# Patient Record
Sex: FEMALE | Race: WHITE | NOT HISPANIC OR LATINO | Employment: OTHER | URBAN - NONMETROPOLITAN AREA
[De-identification: names, ages, dates, MRNs, and addresses within clinical notes are randomized per-mention and may not be internally consistent; named-entity substitution may affect disease eponyms.]

---

## 2019-02-01 ENCOUNTER — OFFICE VISIT (OUTPATIENT)
Dept: FAMILY MEDICINE CLINIC | Facility: CLINIC | Age: 69
End: 2019-02-01
Payer: MEDICARE

## 2019-02-01 VITALS
SYSTOLIC BLOOD PRESSURE: 140 MMHG | HEIGHT: 67 IN | RESPIRATION RATE: 20 BRPM | HEART RATE: 81 BPM | WEIGHT: 282 LBS | OXYGEN SATURATION: 96 % | TEMPERATURE: 96.6 F | BODY MASS INDEX: 44.26 KG/M2 | DIASTOLIC BLOOD PRESSURE: 94 MMHG

## 2019-02-01 DIAGNOSIS — Z11.59 NEED FOR HEPATITIS C SCREENING TEST: ICD-10-CM

## 2019-02-01 DIAGNOSIS — Z76.89 ESTABLISHING CARE WITH NEW DOCTOR, ENCOUNTER FOR: Primary | ICD-10-CM

## 2019-02-01 DIAGNOSIS — E66.01 MORBID OBESITY (HCC): ICD-10-CM

## 2019-02-01 DIAGNOSIS — Z12.31 SCREENING MAMMOGRAM, ENCOUNTER FOR: ICD-10-CM

## 2019-02-01 DIAGNOSIS — Z12.11 ENCOUNTER FOR SCREENING COLONOSCOPY FOR NON-HIGH-RISK PATIENT: ICD-10-CM

## 2019-02-01 DIAGNOSIS — R53.83 FATIGUE, UNSPECIFIED TYPE: ICD-10-CM

## 2019-02-01 PROCEDURE — 99213 OFFICE O/P EST LOW 20 MIN: CPT | Performed by: FAMILY MEDICINE

## 2019-02-01 NOTE — PROGRESS NOTES
OFFICE VISIT  Cassandra Hickey 80 y o  female MRN: [id number]      Assessment / Plan:  Diagnoses and all orders for this visit:    Establishing care with new doctor, encounter for    Screening mammogram, encounter for  -     Mammo screening bilateral w cad; Future    Morbid obesity (HCC)  -     CBC and differential; Future  -     Comprehensive metabolic panel; Future  -     Lipid Panel with Direct LDL reflex; Future  -     TSH, 3rd generation with Free T4 reflex; Future  -     HEMOGLOBIN A1C W/ EAG ESTIMATION; Future  -     UA (URINE) with reflex to Microscopic    Need for hepatitis C screening test  -     Hepatitis C antibody; Future    Fatigue, unspecified type  -     ECG 12 lead; Future  -     XR chest pa & lateral; Future    Encounter for screening colonoscopy for non-high-risk patient  -     Occult Blood, Fecal Immunochemical; Future          Reason For Visit / Chief Complaint  Chief Complaint   Patient presents with    [facility]    Headache    Mass        HPI:  Catrachito Arroyo is a 80 y o  female to est care  She has not been by a PCP for years  She denies any medical problmes, on no medication  She report a mammogram and pap test two years ago  She has never had a colonoscopy  She reports a lump behind her head, she had noticed this lump two months ago  She has prior hx of headaches, which has subsided  She is a smoker, 1 5 packs daily for "years"  No intentions on quitting  Historical Information   History reviewed  No pertinent past medical history    Past Surgical History:   Procedure Laterality Date    GALLBLADDER SURGERY  2008     Social History   History   Alcohol Use    Yes     Comment: socially     History   Drug Use No     History   Smoking Status    Current Every Day Smoker   Smokeless Tobacco    Never Used     Family History   Problem Relation Age of Onset    Cervical cancer Mother     Heart disease Father        Meds/Allergies   No Known Allergies    Meds:  No current outpatient prescriptions on file  REVIEW OF SYSTEMS  Review of Systems   Constitutional: Positive for fatigue  Negative for activity change, chills and fever  HENT: Negative for congestion, ear discharge, ear pain, sinus pain, sinus pressure, sore throat, tinnitus and trouble swallowing  Eyes: Negative for photophobia, pain, discharge, itching and visual disturbance  Respiratory: Negative for cough, chest tightness, shortness of breath and wheezing  Cardiovascular: Negative for chest pain and leg swelling  Gastrointestinal: Negative for abdominal distention, abdominal pain, constipation, diarrhea, nausea and vomiting  Endocrine: Negative for polydipsia, polyphagia and polyuria  Genitourinary: Negative for dysuria and frequency  Musculoskeletal: Negative for arthralgias, myalgias, neck pain and neck stiffness  Skin: Negative for color change  Neurological: Negative for dizziness, syncope, weakness, numbness and headaches  Hematological: Does not bruise/bleed easily  Psychiatric/Behavioral: Negative for behavioral problems, confusion, self-injury, sleep disturbance and suicidal ideas  The patient is not nervous/anxious  Current Vitals:   Blood Pressure: 140/94 (02/01/19 1141)  Pulse: 81 (02/01/19 1141)  Temperature: (!) 96 6 °F (35 9 °C) (02/01/19 1141)  Temp Source: Tympanic (02/01/19 1141)  Respirations: 20 (02/01/19 1141)  Height: 5' 7" (170 2 cm) (02/01/19 1141)  Weight - Scale: 128 kg (282 lb) (02/01/19 1141)  SpO2: 96 % (02/01/19 1141)  [unfilled]    PHYSICAL EXAMS:  Physical Exam   Constitutional: She is oriented to person, place, and time  She appears well-developed and well-nourished  HENT:   Head: Normocephalic  Right Ear: External ear normal    Left Ear: External ear normal    Mouth/Throat: Oropharynx is clear and moist    Eyes: Pupils are equal, round, and reactive to light  Conjunctivae are normal    Neck: Neck supple  Cardiovascular: Normal rate and regular rhythm  Pulmonary/Chest: Effort normal and breath sounds normal    Abdominal: Soft  Bowel sounds are normal  She exhibits no distension  There is no tenderness  Musculoskeletal: Normal range of motion  Neurological: She is alert and oriented to person, place, and time  Skin: Skin is warm and dry  Psychiatric: She has a normal mood and affect  Follow up at this office in one month    Counseling / Coordination of Care  Total floor / unit time spent today 20 minutes  Greater than 50% of total time was spent with the patient and / or family counseling and / or coordination of care

## 2019-03-01 ENCOUNTER — TELEPHONE (OUTPATIENT)
Dept: FAMILY MEDICINE CLINIC | Facility: CLINIC | Age: 69
End: 2019-03-01

## 2022-03-29 ENCOUNTER — APPOINTMENT (EMERGENCY)
Dept: CT IMAGING | Facility: HOSPITAL | Age: 72
DRG: 074 | End: 2022-03-29
Payer: COMMERCIAL

## 2022-03-29 ENCOUNTER — HOSPITAL ENCOUNTER (INPATIENT)
Facility: HOSPITAL | Age: 72
LOS: 2 days | Discharge: HOME | DRG: 074 | End: 2022-03-31
Attending: EMERGENCY MEDICINE | Admitting: FAMILY MEDICINE
Payer: COMMERCIAL

## 2022-03-29 ENCOUNTER — NURSE TRIAGE (OUTPATIENT)
Dept: OTHER | Facility: OTHER | Age: 72
End: 2022-03-29

## 2022-03-29 DIAGNOSIS — E04.1 THYROID NODULE INCIDENTALLY NOTED ON IMAGING STUDY: ICD-10-CM

## 2022-03-29 DIAGNOSIS — I10 PRIMARY HYPERTENSION: ICD-10-CM

## 2022-03-29 DIAGNOSIS — R29.90 STROKE-LIKE SYMPTOMS: Primary | ICD-10-CM

## 2022-03-29 DIAGNOSIS — G51.0 BELL'S PALSY: ICD-10-CM

## 2022-03-29 DIAGNOSIS — E78.5 DYSLIPIDEMIA: ICD-10-CM

## 2022-03-29 DIAGNOSIS — E04.1 THYROID NODULE: ICD-10-CM

## 2022-03-29 LAB
ANION GAP SERPL CALCULATED.3IONS-SCNC: 3 MMOL/L (ref 4–13)
APTT PPP: 46 SECONDS (ref 23–37)
BUN SERPL-MCNC: 13 MG/DL (ref 5–25)
CALCIUM SERPL-MCNC: 9 MG/DL (ref 8.3–10.1)
CARDIAC TROPONIN I PNL SERPL HS: 3 NG/L
CHLORIDE SERPL-SCNC: 107 MMOL/L (ref 100–108)
CO2 SERPL-SCNC: 24 MMOL/L (ref 21–32)
CREAT SERPL-MCNC: 0.96 MG/DL (ref 0.6–1.3)
ERYTHROCYTE [DISTWIDTH] IN BLOOD BY AUTOMATED COUNT: 13.2 % (ref 11.6–15.1)
GFR SERPL CREATININE-BSD FRML MDRD: 60 ML/MIN/1.73SQ M
GLUCOSE SERPL-MCNC: 107 MG/DL (ref 65–140)
GLUCOSE SERPL-MCNC: 115 MG/DL (ref 65–140)
HCT VFR BLD AUTO: 49.9 % (ref 34.8–46.1)
HGB BLD-MCNC: 16.2 G/DL (ref 11.5–15.4)
INR PPP: 0.96 (ref 0.84–1.19)
MCH RBC QN AUTO: 31.9 PG (ref 26.8–34.3)
MCHC RBC AUTO-ENTMCNC: 32.5 G/DL (ref 31.4–37.4)
MCV RBC AUTO: 98 FL (ref 82–98)
NT-PROBNP SERPL-MCNC: 98 PG/ML
PLATELET # BLD AUTO: 262 THOUSANDS/UL (ref 149–390)
PMV BLD AUTO: 10.2 FL (ref 8.9–12.7)
POTASSIUM SERPL-SCNC: 3.8 MMOL/L (ref 3.5–5.3)
PROTHROMBIN TIME: 12.4 SECONDS (ref 11.6–14.5)
RBC # BLD AUTO: 5.08 MILLION/UL (ref 3.81–5.12)
SODIUM SERPL-SCNC: 134 MMOL/L (ref 136–145)
WBC # BLD AUTO: 10.84 THOUSAND/UL (ref 4.31–10.16)

## 2022-03-29 PROCEDURE — 82948 REAGENT STRIP/BLOOD GLUCOSE: CPT

## 2022-03-29 PROCEDURE — 99291 CRITICAL CARE FIRST HOUR: CPT | Performed by: EMERGENCY MEDICINE

## 2022-03-29 PROCEDURE — 85027 COMPLETE CBC AUTOMATED: CPT | Performed by: EMERGENCY MEDICINE

## 2022-03-29 PROCEDURE — 99222 1ST HOSP IP/OBS MODERATE 55: CPT

## 2022-03-29 PROCEDURE — 85610 PROTHROMBIN TIME: CPT | Performed by: EMERGENCY MEDICINE

## 2022-03-29 PROCEDURE — 80048 BASIC METABOLIC PNL TOTAL CA: CPT | Performed by: EMERGENCY MEDICINE

## 2022-03-29 PROCEDURE — 93005 ELECTROCARDIOGRAM TRACING: CPT

## 2022-03-29 PROCEDURE — 85730 THROMBOPLASTIN TIME PARTIAL: CPT | Performed by: EMERGENCY MEDICINE

## 2022-03-29 PROCEDURE — 70498 CT ANGIOGRAPHY NECK: CPT

## 2022-03-29 PROCEDURE — 84484 ASSAY OF TROPONIN QUANT: CPT | Performed by: EMERGENCY MEDICINE

## 2022-03-29 PROCEDURE — 70496 CT ANGIOGRAPHY HEAD: CPT

## 2022-03-29 PROCEDURE — 99285 EMERGENCY DEPT VISIT HI MDM: CPT

## 2022-03-29 PROCEDURE — 83880 ASSAY OF NATRIURETIC PEPTIDE: CPT | Performed by: EMERGENCY MEDICINE

## 2022-03-29 PROCEDURE — 36415 COLL VENOUS BLD VENIPUNCTURE: CPT | Performed by: EMERGENCY MEDICINE

## 2022-03-29 RX ORDER — ASPIRIN 325 MG
325 TABLET ORAL ONCE
Status: COMPLETED | OUTPATIENT
Start: 2022-03-29 | End: 2022-03-29

## 2022-03-29 RX ADMIN — ASPIRIN 325 MG: 325 TABLET ORAL at 21:53

## 2022-03-29 RX ADMIN — IOHEXOL 85 ML: 350 INJECTION, SOLUTION INTRAVENOUS at 21:29

## 2022-03-29 NOTE — TELEPHONE ENCOUNTER
Regarding: facial parts drooping   ----- Message from Gal Archibald sent at 3/29/2022  7:33 PM EDT -----  " My wife said her lip hurt and I looked over her lip is hanging to the right and her eye is also hanging to the right "

## 2022-03-29 NOTE — TELEPHONE ENCOUNTER
Spoke with patient's  and he reports she has facial drooping of left lip and eye  Denies other symptoms but difficult to get assessment  Patient does not have PCP  Advised he call 911 for her immediately  Patient stating she does not want to go to the hospital because she is tired  I encouraged patient and  for them to call 911 because of this serious emergent concern  I am not sure if they will follow recommendations

## 2022-03-29 NOTE — TELEPHONE ENCOUNTER
Reason for Disposition   [1] Weakness (i e , paralysis, loss of muscle strength) of the face, arm / hand, or leg / foot on one side of the body AND [2] sudden onset AND [3] present now (Exception: Bell's palsy suspected [i e , weakness only one side of the face, developing over hours to days, no other symptoms])    Answer Assessment - Initial Assessment Questions  1  SYMPTOM: "What is the main symptom you are concerned about?" (e g , weakness, numbness)      She is having difficulty chewing on the right side and drooling so when looking at her her left side of lip and eye is hanging     2  ONSET: "When did this start?" (minutes, hours, days; while sleeping)      Noticed it an hour ago     3  LAST NORMAL: "When was the last time you were normal (no symptoms)?"      around her     4  PATTERN "Does this come and go, or has it been constant since it started?"  "Is it present now?"      Constant     5  CARDIAC SYMPTOMS: "Have you had any of the following symptoms: chest pain, difficulty breathing, palpitations?"      Denies     6  NEUROLOGIC SYMPTOMS: "Have you had any of the following symptoms: headache, dizziness, vision loss, double vision, changes in speech, unsteady on your feet?"       Drooping face     7   OTHER SYMPTOMS: "Do you have any other symptoms?"  Denies    Protocols used: NEUROLOGIC DEFICIT-ADULT-

## 2022-03-30 ENCOUNTER — APPOINTMENT (INPATIENT)
Dept: MRI IMAGING | Facility: HOSPITAL | Age: 72
DRG: 074 | End: 2022-03-30
Payer: COMMERCIAL

## 2022-03-30 ENCOUNTER — APPOINTMENT (INPATIENT)
Dept: NON INVASIVE DIAGNOSTICS | Facility: HOSPITAL | Age: 72
DRG: 074 | End: 2022-03-30
Payer: COMMERCIAL

## 2022-03-30 PROBLEM — E66.01 CLASS 3 SEVERE OBESITY DUE TO EXCESS CALORIES WITH SERIOUS COMORBIDITY AND BODY MASS INDEX (BMI) OF 45.0 TO 49.9 IN ADULT (HCC): Status: ACTIVE | Noted: 2022-03-30

## 2022-03-30 PROBLEM — E66.813 CLASS 3 SEVERE OBESITY DUE TO EXCESS CALORIES WITH SERIOUS COMORBIDITY AND BODY MASS INDEX (BMI) OF 45.0 TO 49.9 IN ADULT (HCC): Status: ACTIVE | Noted: 2022-03-30

## 2022-03-30 PROBLEM — I10 PRIMARY HYPERTENSION: Status: ACTIVE | Noted: 2022-03-30

## 2022-03-30 PROBLEM — R29.90 STROKE-LIKE SYMPTOMS: Status: ACTIVE | Noted: 2022-03-30

## 2022-03-30 LAB
ALBUMIN SERPL BCP-MCNC: 3.2 G/DL (ref 3.5–5)
ALP SERPL-CCNC: 123 U/L (ref 46–116)
ALT SERPL W P-5'-P-CCNC: 33 U/L (ref 12–78)
ANION GAP SERPL CALCULATED.3IONS-SCNC: 9 MMOL/L (ref 4–13)
AORTIC ROOT: 3.1 CM
APICAL FOUR CHAMBER EJECTION FRACTION: 56 %
AST SERPL W P-5'-P-CCNC: 21 U/L (ref 5–45)
BASOPHILS # BLD AUTO: 0.05 THOUSANDS/ΜL (ref 0–0.1)
BASOPHILS NFR BLD AUTO: 1 % (ref 0–1)
BILIRUB SERPL-MCNC: 0.38 MG/DL (ref 0.2–1)
BUN SERPL-MCNC: 11 MG/DL (ref 5–25)
CALCIUM ALBUM COR SERPL-MCNC: 9.6 MG/DL (ref 8.3–10.1)
CALCIUM SERPL-MCNC: 9 MG/DL (ref 8.3–10.1)
CHLORIDE SERPL-SCNC: 107 MMOL/L (ref 100–108)
CHOLEST SERPL-MCNC: 244 MG/DL
CO2 SERPL-SCNC: 24 MMOL/L (ref 21–32)
CREAT SERPL-MCNC: 0.91 MG/DL (ref 0.6–1.3)
E WAVE DECELERATION TIME: 208 MS
EOSINOPHIL # BLD AUTO: 0.18 THOUSAND/ΜL (ref 0–0.61)
EOSINOPHIL NFR BLD AUTO: 2 % (ref 0–6)
ERYTHROCYTE [DISTWIDTH] IN BLOOD BY AUTOMATED COUNT: 13.2 % (ref 11.6–15.1)
EST. AVERAGE GLUCOSE BLD GHB EST-MCNC: 120 MG/DL
FRACTIONAL SHORTENING: 28 % (ref 28–44)
GFR SERPL CREATININE-BSD FRML MDRD: 64 ML/MIN/1.73SQ M
GLUCOSE SERPL-MCNC: 130 MG/DL (ref 65–140)
HBA1C MFR BLD: 5.8 %
HCT VFR BLD AUTO: 47 % (ref 34.8–46.1)
HDLC SERPL-MCNC: 42 MG/DL
HGB BLD-MCNC: 15.9 G/DL (ref 11.5–15.4)
IMM GRANULOCYTES # BLD AUTO: 0.04 THOUSAND/UL (ref 0–0.2)
IMM GRANULOCYTES NFR BLD AUTO: 0 % (ref 0–2)
INTERVENTRICULAR SEPTUM IN DIASTOLE (PARASTERNAL SHORT AXIS VIEW): 1.4 CM
INTERVENTRICULAR SEPTUM: 1.4 CM (ref 0.59–1.1)
LDLC SERPL CALC-MCNC: 181 MG/DL (ref 0–100)
LEFT ATRIUM AREA SYSTOLE SINGLE PLANE A4C: 14.2 CM2
LEFT ATRIUM SIZE: 3.6 CM
LEFT INTERNAL DIMENSION IN SYSTOLE: 3.3 CM (ref 7.86–11.92)
LEFT VENTRICULAR INTERNAL DIMENSION IN DIASTOLE: 4.6 CM (ref 13.44–20.04)
LEFT VENTRICULAR POSTERIOR WALL IN END DIASTOLE: 1.3 CM (ref 0.57–1.09)
LEFT VENTRICULAR STROKE VOLUME: 54 ML
LVSV (TEICH): 54 ML
LYMPHOCYTES # BLD AUTO: 2.87 THOUSANDS/ΜL (ref 0.6–4.47)
LYMPHOCYTES NFR BLD AUTO: 27 % (ref 14–44)
MCH RBC QN AUTO: 31.9 PG (ref 26.8–34.3)
MCHC RBC AUTO-ENTMCNC: 33.8 G/DL (ref 31.4–37.4)
MCV RBC AUTO: 94 FL (ref 82–98)
MONOCYTES # BLD AUTO: 1.03 THOUSAND/ΜL (ref 0.17–1.22)
MONOCYTES NFR BLD AUTO: 10 % (ref 4–12)
MV E'TISSUE VEL-SEP: 7 CM/S
MV PEAK A VEL: 1.01 M/S
MV PEAK E VEL: 49 CM/S
MV STENOSIS PRESSURE HALF TIME: 60 MS
MV VALVE AREA P 1/2 METHOD: 3.67 CM2
NEUTROPHILS # BLD AUTO: 6.36 THOUSANDS/ΜL (ref 1.85–7.62)
NEUTS SEG NFR BLD AUTO: 60 % (ref 43–75)
NRBC BLD AUTO-RTO: 0 /100 WBCS
PLATELET # BLD AUTO: 241 THOUSANDS/UL (ref 149–390)
PLATELET # BLD AUTO: 246 THOUSANDS/UL (ref 149–390)
PMV BLD AUTO: 10.5 FL (ref 8.9–12.7)
PMV BLD AUTO: 9.9 FL (ref 8.9–12.7)
POTASSIUM SERPL-SCNC: 3.8 MMOL/L (ref 3.5–5.3)
PROT SERPL-MCNC: 7.4 G/DL (ref 6.4–8.2)
RBC # BLD AUTO: 4.99 MILLION/UL (ref 3.81–5.12)
RIGHT ATRIUM AREA SYSTOLE A4C: 12.7 CM2
RIGHT VENTRICLE ID DIMENSION: 1.9 CM
SL CV LV EF: 65
SL CV PED ECHO LEFT VENTRICLE DIASTOLIC VOLUME (MOD BIPLANE) 2D: 99 ML
SL CV PED ECHO LEFT VENTRICLE SYSTOLIC VOLUME (MOD BIPLANE) 2D: 45 ML
SODIUM SERPL-SCNC: 140 MMOL/L (ref 136–145)
TRICUSPID ANNULAR PLANE SYSTOLIC EXCURSION: 2.2 CM
TRIGL SERPL-MCNC: 106 MG/DL
WBC # BLD AUTO: 10.53 THOUSAND/UL (ref 4.31–10.16)
Z-SCORE OF INTERVENTRICULAR SEPTUM IN END DIASTOLE: 4.24
Z-SCORE OF LEFT VENTRICULAR DIMENSION IN END DIASTOLE: -12.72
Z-SCORE OF LEFT VENTRICULAR DIMENSION IN END SYSTOLE: -8.5
Z-SCORE OF LEFT VENTRICULAR POSTERIOR WALL IN END DIASTOLE: 3.58

## 2022-03-30 PROCEDURE — 83036 HEMOGLOBIN GLYCOSYLATED A1C: CPT

## 2022-03-30 PROCEDURE — 85025 COMPLETE CBC W/AUTO DIFF WBC: CPT

## 2022-03-30 PROCEDURE — G1004 CDSM NDSC: HCPCS

## 2022-03-30 PROCEDURE — 99448 NTRPROF PH1/NTRNET/EHR 21-30: CPT | Performed by: PSYCHIATRY & NEUROLOGY

## 2022-03-30 PROCEDURE — 70551 MRI BRAIN STEM W/O DYE: CPT

## 2022-03-30 PROCEDURE — 93306 TTE W/DOPPLER COMPLETE: CPT

## 2022-03-30 PROCEDURE — 84443 ASSAY THYROID STIM HORMONE: CPT | Performed by: INTERNAL MEDICINE

## 2022-03-30 PROCEDURE — 80053 COMPREHEN METABOLIC PANEL: CPT

## 2022-03-30 PROCEDURE — 36415 COLL VENOUS BLD VENIPUNCTURE: CPT

## 2022-03-30 PROCEDURE — 93306 TTE W/DOPPLER COMPLETE: CPT | Performed by: INTERNAL MEDICINE

## 2022-03-30 PROCEDURE — 85049 AUTOMATED PLATELET COUNT: CPT

## 2022-03-30 PROCEDURE — 80061 LIPID PANEL: CPT

## 2022-03-30 PROCEDURE — 99233 SBSQ HOSP IP/OBS HIGH 50: CPT | Performed by: INTERNAL MEDICINE

## 2022-03-30 PROCEDURE — 97162 PT EVAL MOD COMPLEX 30 MIN: CPT

## 2022-03-30 RX ORDER — CALCIUM CARBONATE 200(500)MG
500 TABLET,CHEWABLE ORAL DAILY PRN
Status: DISCONTINUED | OUTPATIENT
Start: 2022-03-30 | End: 2022-03-31 | Stop reason: HOSPADM

## 2022-03-30 RX ORDER — CLOPIDOGREL BISULFATE 75 MG/1
75 TABLET ORAL DAILY
Status: DISCONTINUED | OUTPATIENT
Start: 2022-03-30 | End: 2022-03-30

## 2022-03-30 RX ORDER — AMLODIPINE BESYLATE 5 MG/1
5 TABLET ORAL DAILY
Status: DISCONTINUED | OUTPATIENT
Start: 2022-03-30 | End: 2022-03-31 | Stop reason: HOSPADM

## 2022-03-30 RX ORDER — ACETAMINOPHEN 325 MG/1
650 TABLET ORAL EVERY 4 HOURS PRN
Status: DISCONTINUED | OUTPATIENT
Start: 2022-03-30 | End: 2022-03-31 | Stop reason: HOSPADM

## 2022-03-30 RX ORDER — FAMOTIDINE 20 MG/1
20 TABLET, FILM COATED ORAL ONCE
Status: COMPLETED | OUTPATIENT
Start: 2022-03-30 | End: 2022-03-30

## 2022-03-30 RX ORDER — PANTOPRAZOLE SODIUM 40 MG/1
40 TABLET, DELAYED RELEASE ORAL
Status: DISCONTINUED | OUTPATIENT
Start: 2022-03-31 | End: 2022-03-31 | Stop reason: HOSPADM

## 2022-03-30 RX ORDER — SODIUM CHLORIDE, SODIUM LACTATE, POTASSIUM CHLORIDE, CALCIUM CHLORIDE 600; 310; 30; 20 MG/100ML; MG/100ML; MG/100ML; MG/100ML
75 INJECTION, SOLUTION INTRAVENOUS CONTINUOUS
Status: DISCONTINUED | OUTPATIENT
Start: 2022-03-30 | End: 2022-03-30

## 2022-03-30 RX ORDER — HEPARIN SODIUM 5000 [USP'U]/ML
5000 INJECTION, SOLUTION INTRAVENOUS; SUBCUTANEOUS EVERY 8 HOURS SCHEDULED
Status: DISCONTINUED | OUTPATIENT
Start: 2022-03-30 | End: 2022-03-30

## 2022-03-30 RX ORDER — ATORVASTATIN CALCIUM 40 MG/1
40 TABLET, FILM COATED ORAL EVERY EVENING
Status: DISCONTINUED | OUTPATIENT
Start: 2022-03-30 | End: 2022-03-31 | Stop reason: HOSPADM

## 2022-03-30 RX ORDER — LORAZEPAM 2 MG/ML
0.5 INJECTION INTRAMUSCULAR ONCE
Status: COMPLETED | OUTPATIENT
Start: 2022-03-30 | End: 2022-03-30

## 2022-03-30 RX ORDER — NICOTINE 21 MG/24HR
1 PATCH, TRANSDERMAL 24 HOURS TRANSDERMAL DAILY
Status: DISCONTINUED | OUTPATIENT
Start: 2022-03-30 | End: 2022-03-31 | Stop reason: HOSPADM

## 2022-03-30 RX ORDER — ONDANSETRON 2 MG/ML
4 INJECTION INTRAMUSCULAR; INTRAVENOUS EVERY 6 HOURS PRN
Status: DISCONTINUED | OUTPATIENT
Start: 2022-03-30 | End: 2022-03-31 | Stop reason: HOSPADM

## 2022-03-30 RX ORDER — MINERAL OIL AND PETROLATUM 150; 830 MG/G; MG/G
OINTMENT OPHTHALMIC
Status: DISCONTINUED | OUTPATIENT
Start: 2022-03-30 | End: 2022-03-31 | Stop reason: HOSPADM

## 2022-03-30 RX ORDER — VALACYCLOVIR HYDROCHLORIDE 500 MG/1
1000 TABLET, FILM COATED ORAL EVERY 8 HOURS SCHEDULED
Status: DISCONTINUED | OUTPATIENT
Start: 2022-03-30 | End: 2022-03-31 | Stop reason: HOSPADM

## 2022-03-30 RX ORDER — HEPARIN SODIUM 5000 [USP'U]/ML
5000 INJECTION, SOLUTION INTRAVENOUS; SUBCUTANEOUS EVERY 8 HOURS SCHEDULED
Status: DISCONTINUED | OUTPATIENT
Start: 2022-03-30 | End: 2022-03-31 | Stop reason: HOSPADM

## 2022-03-30 RX ORDER — PREDNISONE 20 MG/1
80 TABLET ORAL DAILY
Status: DISCONTINUED | OUTPATIENT
Start: 2022-03-30 | End: 2022-03-31 | Stop reason: HOSPADM

## 2022-03-30 RX ADMIN — AMLODIPINE BESYLATE 5 MG: 5 TABLET ORAL at 16:23

## 2022-03-30 RX ADMIN — HEPARIN SODIUM 5000 UNITS: 5000 INJECTION INTRAVENOUS; SUBCUTANEOUS at 00:53

## 2022-03-30 RX ADMIN — HEPARIN SODIUM 5000 UNITS: 5000 INJECTION INTRAVENOUS; SUBCUTANEOUS at 14:22

## 2022-03-30 RX ADMIN — ATORVASTATIN CALCIUM 40 MG: 40 TABLET, FILM COATED ORAL at 17:18

## 2022-03-30 RX ADMIN — PREDNISONE 80 MG: 20 TABLET ORAL at 16:23

## 2022-03-30 RX ADMIN — CALCIUM CARBONATE 500 MG: 500 TABLET, CHEWABLE ORAL at 23:34

## 2022-03-30 RX ADMIN — HEPARIN SODIUM 5000 UNITS: 5000 INJECTION INTRAVENOUS; SUBCUTANEOUS at 21:51

## 2022-03-30 RX ADMIN — FAMOTIDINE 20 MG: 20 TABLET ORAL at 23:34

## 2022-03-30 RX ADMIN — HEPARIN SODIUM 5000 UNITS: 5000 INJECTION INTRAVENOUS; SUBCUTANEOUS at 06:20

## 2022-03-30 RX ADMIN — ATORVASTATIN CALCIUM 40 MG: 40 TABLET, FILM COATED ORAL at 00:53

## 2022-03-30 RX ADMIN — VALACYCLOVIR HYDROCHLORIDE 1000 MG: 500 TABLET, FILM COATED ORAL at 17:18

## 2022-03-30 RX ADMIN — LORAZEPAM 0.5 MG: 2 INJECTION INTRAMUSCULAR; INTRAVENOUS at 12:04

## 2022-03-30 RX ADMIN — SODIUM CHLORIDE, SODIUM LACTATE, POTASSIUM CHLORIDE, AND CALCIUM CHLORIDE 75 ML/HR: .6; .31; .03; .02 INJECTION, SOLUTION INTRAVENOUS at 00:53

## 2022-03-30 RX ADMIN — MINERAL OIL AND PETROLATUM: 150; 830 OINTMENT OPHTHALMIC at 21:51

## 2022-03-30 NOTE — QUICK NOTE
Stroke alert called: 2043  Neurology response immediate  LKW: last night  NIHSS 1  for R facial droop per ED exam       CTH: No acute intracranial abnormality  Microangiopathic changes  CTA H/N: no LVO  IVtPA, Thrombectomy: not candidate due to out of window    A/P     79 yo, no PMH per ER, woke up this morning with R facial droop  NIHSS now 1  Reported LKN was last night     Impression stroke vs hypertensive emergency    Recommend admit under stroke protocol  SBP<180  MRI brain  without contrast routine  ASA now  Discussed with ED physician at time of alert

## 2022-03-30 NOTE — ASSESSMENT & PLAN NOTE
· Patient presented with facial droop x 12 hours to ER    Patient exhibits no other focal neurologic deficits  · No history of stroke  · CT of the head negative for intracranial bleed  · ED provider discussed case with Neurology who recommended patient be admitted here for stroke pathway and 2 start aspirin with loading dose of 325 mg   · Aspirin 325 admitted in ED  · Start aspirin 81 mg oral daily  · Atorvastatin 40 mg oral daily  · MRI ordered  · Neurology consult  · Neurochecks ordered  · Will allow permissive hypertension

## 2022-03-30 NOTE — H&P
[address] [id number] [place]  H&P- Anna Weir 1950, 80 y o  female MRN: [id number]  Unit/Bed#: PT42 Encounter: [id number]  Primary Care Provider: No primary care provider on file  Date and time admitted to hospital: 3/29/2022  8:34 PM    * Stroke-like symptoms  Assessment & Plan  · Patient presented with facial droop x 12 hours to ER  Patient exhibits no other focal neurologic deficits  · No history of stroke  · CT of the head negative for intracranial bleed  · ED provider discussed case with Neurology who recommended patient be admitted here for stroke pathway and 2 start aspirin with loading dose of 325 mg   · Aspirin 325 admitted in ED  · Start aspirin 81 mg oral daily  · Atorvastatin 40 mg oral daily  · MRI ordered  · Neurology consult  · Neurochecks ordered  · Will allow permissive hypertension    Primary hypertension  Assessment & Plan  · Patient was hypertensive upon arrival to ED  · Current blood pressure 173/98  · Patient is not maintained on any antihypertensives as I the hospital  · Will hold off on antihypertensives for now due to stroke-like symptoms to allow for permissive hypertension  · Will monitor with routine vitals checks    VTE Pharmacologic Prophylaxis: VTE Score: 8 High Risk (Score >/= 5) - Pharmacological DVT Prophylaxis Ordered: heparin  Sequential Compression Devices Ordered  Code Status: Level 1 - Full Code   Discussion with family: Patient declined call to   Anticipated Length of Stay: Patient will be admitted on an inpatient basis with an anticipated length of stay of greater than 2 midnights secondary to Stroke like symptoms   Total Time for Visit, including Counseling / Coordination of Care: 45 minutes Greater than 50% of this total time spent on direct patient counseling and coordination of care      Chief Complaint:  Facial droop    History of Present Illness:  Anna Weir is a 80 y o  female with a PMH of thyroid nodule  who presents with stroke-like symptoms Times 12 hours  Patient states that when she woke up this morning, she noticed a facial droop and some slurred speech  She states that the drip is on her right side  She also states that when she would try to drink water with the catheter right side of her mouth  She states that she was normal when she went to bed well last night which is approximately at 4:00 a [facility] She denies any recent falls, head pain  She does not take any blood thinners or any other medications  She states that other than this current episode she is generally healthy  She denies any current nausea, vomiting, chest pain, palpitations, fever, chills, recent illness, arm or leg weakness  She does not follow with a PCP regularly  Patient receive CT scan of the head  Negative for intracranial hemorrhage  ED discussed with neurology who recommended admission on stroke pathway with aspirin 325 mg an MRI in a [facility] Review of Systems:  Review of Systems   Constitutional: Negative for chills and fever  HENT: Negative for ear pain, sore throat and trouble swallowing  Eyes: Negative for pain and visual disturbance  Respiratory: Negative for cough, shortness of breath and wheezing  Cardiovascular: Negative for chest pain, palpitations and leg swelling  Gastrointestinal: Negative for abdominal pain, diarrhea, nausea and vomiting  Endocrine: Negative for polydipsia and polyuria  Genitourinary: Negative for dysuria and hematuria  Musculoskeletal: Negative for arthralgias and back pain  Skin: Negative for color change and rash  Neurological: Positive for facial asymmetry and speech difficulty  Negative for dizziness, seizures, syncope, weakness, light-headedness, numbness and headaches  Psychiatric/Behavioral: Negative for agitation and behavioral problems  All other systems reviewed and are negative  Past Medical and Surgical History:   History reviewed   No pertinent past medical history  Past Surgical History:   Procedure Laterality Date    GALLBLADDER SURGERY  2008       Meds/Allergies:  Prior to Admission medications    Not on File     Patient takes no home medications   Allergies: No Known Allergies    Social History:  Marital Status: /Civil Union   Occupation: none   Patient Pre-hospital Living Situation: Home  Patient Pre-hospital Level of Mobility: walks  Patient Pre-hospital Diet Restrictions: None   Substance Use History:   Social History     Substance and Sexual Activity   Alcohol Use Yes    Comment: socially     Social History     Tobacco Use   Smoking Status Current Every Day Smoker   Smokeless Tobacco Never Used     Social History     Substance and Sexual Activity   Drug Use No       Family History:  Family History   Problem Relation Age of Onset    Cervical cancer Mother     Heart disease Father        Physical Exam:     Vitals:   Blood Pressure: (!) 173/98 (03/30/22 0444)  Pulse: 79 (03/30/22 0444)  Temperature: 98 2 °F (36 8 °C) (03/30/22 0100)  Temp Source: Tympanic (03/30/22 0100)  Respirations: 22 (03/30/22 0444)  Weight - Scale: 135 kg (296 lb 15 4 oz) (03/29/22 2038)  SpO2: 92 % (03/30/22 0444)    Physical Exam  Vitals and nursing note reviewed  Constitutional:       General: She is not in acute distress  Appearance: Normal appearance  She is well-developed  She is obese  She is not ill-appearing  HENT:      Head: Normocephalic and atraumatic  Nose: Nose normal  No congestion or rhinorrhea  Mouth/Throat:      Mouth: Mucous membranes are moist       Pharynx: Oropharynx is clear  No oropharyngeal exudate or posterior oropharyngeal erythema  Eyes:      General: No visual field deficit or scleral icterus  Right eye: No discharge  Left eye: No discharge  Extraocular Movements: Extraocular movements intact  Conjunctiva/sclera: Conjunctivae normal       Pupils: Pupils are equal, round, and reactive to light  Cardiovascular:      Rate and Rhythm: Normal rate and regular rhythm  Pulses: Normal pulses  Heart sounds: Normal heart sounds  No murmur heard  No friction rub  No gallop  Pulmonary:      Effort: Pulmonary effort is normal  No respiratory distress  Breath sounds: Normal breath sounds  No wheezing, rhonchi or rales  Abdominal:      General: Bowel sounds are normal  There is no distension  Palpations: Abdomen is soft  Tenderness: There is no abdominal tenderness  There is no guarding  Musculoskeletal:         General: Normal range of motion  Cervical back: Normal range of motion and neck supple  No rigidity  Right lower leg: No edema  Skin:     General: Skin is warm and dry  Capillary Refill: Capillary refill takes less than 2 seconds  Neurological:      Mental Status: She is alert and oriented to person, place, and time  Mental status is at baseline  GCS: GCS eye subscore is 4  GCS verbal subscore is 5  GCS motor subscore is 6  Cranial Nerves: Cranial nerve deficit and facial asymmetry present  Sensory: No sensory deficit  Motor: Motor function is intact  No weakness, tremor, atrophy, abnormal muscle tone or pronator drift  Coordination: Coordination is intact  Gait: Gait is intact  Psychiatric:         Mood and Affect: Mood normal          Behavior: Behavior normal           Additional Data:     Lab Results:  Results from last 7 days   Lab Units 03/30/22  0052 03/29/22 2053 03/29/22 2053   WBC Thousand/uL  --   --  10 84*   HEMOGLOBIN g/dL  --   --  16 2*   HEMATOCRIT %  --   --  49 9*   PLATELETS Thousands/uL 241   < > 262    < > = values in this interval not displayed       Results from last 7 days   Lab Units 03/29/22 2053   SODIUM mmol/L 134*   POTASSIUM mmol/L 3 8   CHLORIDE mmol/L 107   CO2 mmol/L 24   BUN mg/dL 13   CREATININE mg/dL 0 96   ANION GAP mmol/L 3*   CALCIUM mg/dL 9 0   GLUCOSE RANDOM mg/dL 115     Results from last 7 days   Lab Units 03/29/22 2053   INR  0 96     Results from last 7 days   Lab Units 03/29/22 2053   POC GLUCOSE mg/dl 107               Imaging: Reviewed radiology reports from this admission including: CT head  CT stroke alert brain   Final Result by Arlet Zuniga MD (03/29 2135)      No acute intracranial abnormality  Microangiopathic changes  I personally discussed this study with neurologist on 3/29/2022 at 9:22 PM                 Workstation performed: [id number]         CTA stroke alert (head/neck)   Final Result by Arlet Zuniga MD (03/29 2243)      No evidence of hemodynamic significant stenosis, aneurysm or dissection  1 8 cm left thyroid nodule  Incidental discovery of one or more thyroid nodule(s) measuring more than 1 5 cm and without suspicious features is noted in this patient who is above 28years old; according to guidelines published in the February 2015 white    paper on incidental thyroid nodules in the Journal of the [employer] of Radiology Scott Gant), further characterization with thyroid ultrasound is recommended  I personally discussed this study with [address] [place] [address] on 3/29/2022 at 9:36 PM                             Workstation performed: [id number]         MRI Inpatient Order    (Results Pending)       EKG and Other Studies Reviewed on Admission:   · EKG: NSR  HR 78     ** Please Note: This note has been constructed using a voice recognition system   **

## 2022-03-30 NOTE — ASSESSMENT & PLAN NOTE
· Patient was hypertensive upon arrival to ED  · Current blood pressure 173/98  · Patient is not maintained on any antihypertensives as I the hospital  · Will hold off on antihypertensives for now due to stroke-like symptoms to allow for permissive hypertension  · Will monitor with routine vitals checks

## 2022-03-30 NOTE — PLAN OF CARE
Problem: MOBILITY - ADULT  Goal: Maintain or return to baseline ADL function  Description: INTERVENTIONS:  -  Assess patient's ability to carry out ADLs; assess patient's baseline for ADL function and identify physical deficits which impact ability to perform ADLs (bathing, care of mouth/teeth, toileting, grooming, dressing, etc )  - Assess/evaluate cause of self-care deficits   - Assess range of motion  - Assess patient's mobility; develop plan if impaired  - Assess patient's need for assistive devices and provide as appropriate  - Encourage maximum independence but intervene and supervise when necessary  - Involve family in performance of ADLs  - Assess for home care needs following discharge   - Consider OT consult to assist with ADL evaluation and planning for discharge  - Provide patient education as appropriate  Outcome: Progressing  Goal: Maintains/Returns to pre admission functional level  Description: INTERVENTIONS:  - Perform BMAT or MOVE assessment daily    - Set and communicate daily mobility goal to care team and patient/family/caregiver  - Collaborate with rehabilitation services on mobility goals if consulted  - Perform Range of Motion 4 times a day  - Reposition patient every 2 hours    - Dangle patient 4 times a day  - Ambulate patient 2 times a day  - Out of bed to chair 4 times a day   - Out of bed for meals 4 times a day  - Out of bed for toileting  - Record patient progress and toleration of activity level   Outcome: Progressing     Problem: Potential for Falls  Goal: Patient will remain free of falls  Description: INTERVENTIONS:  - Educate patient/family on patient safety including physical limitations  - Instruct patient to call for assistance with activity   - Consult OT/PT to assist with strengthening/mobility   - Keep Call bell within reach  - Keep bed low and locked with side rails adjusted as appropriate  - Keep care items and personal belongings within reach  - Initiate and maintain comfort rounds  - Make Fall Risk Sign visible to staff  - Offer Toileting every 2 Hours, in advance of need  - Initiate/Maintain bed alarm alarm  - Apply yellow socks and bracelet for high fall risk patients  - Consider moving patient to room near nurses station  Outcome: Progressing     Problem: PAIN - ADULT  Goal: Verbalizes/displays adequate comfort level or baseline comfort level  Description: Interventions:  - Encourage patient to monitor pain and request assistance  - Assess pain using appropriate pain scale  - Administer analgesics based on type and severity of pain and evaluate response  - Implement non-pharmacological measures as appropriate and evaluate response  - Consider cultural and social influences on pain and pain management  - Notify physician/advanced practitioner if interventions unsuccessful or patient reports new pain  Outcome: Progressing     Problem: INFECTION - ADULT  Goal: Absence or prevention of progression during hospitalization  Description: INTERVENTIONS:  - Assess and monitor for signs and symptoms of infection  - Monitor lab/diagnostic results  - Monitor all insertion sites, i e  indwelling lines, tubes, and drains  - Administer medications as ordered  - Instruct and encourage patient and family to use good hand hygiene technique  - Identify and instruct in appropriate isolation precautions for identified infection/condition  Outcome: Progressing     Problem: SAFETY ADULT  Goal: Maintain or return to baseline ADL function  Description: INTERVENTIONS:  -  Assess patient's ability to carry out ADLs; assess patient's baseline for ADL function and identify physical deficits which impact ability to perform ADLs (bathing, care of mouth/teeth, toileting, grooming, dressing, etc )  - Assess/evaluate cause of self-care deficits   - Assess range of motion  - Assess patient's mobility; develop plan if impaired  - Assess patient's need for assistive devices and provide as appropriate  - Encourage maximum independence but intervene and supervise when necessary  - Involve family in performance of ADLs  - Assess for home care needs following discharge   - Consider OT consult to assist with ADL evaluation and planning for discharge  - Provide patient education as appropriate  Outcome: Progressing  Goal: Maintains/Returns to pre admission functional level  Description: INTERVENTIONS:  - Perform BMAT or MOVE assessment daily    - Set and communicate daily mobility goal to care team and patient/family/caregiver  - Collaborate with rehabilitation services on mobility goals if consulted  - Perform Range of Motion 4 times a day  - Reposition patient every 2 hours    - Dangle patient 4 times a day  - Ambulate patient 2 times a day  - Out of bed to chair 4 times a day   - Out of bed for meals 4 times a day  - Out of bed for toileting  - Record patient progress and toleration of activity level   Outcome: Progressing  Goal: Patient will remain free of falls  Description: INTERVENTIONS:  - Educate patient/family on patient safety including physical limitations  - Instruct patient to call for assistance with activity   - Consult OT/PT to assist with strengthening/mobility   - Keep Call bell within reach  - Keep bed low and locked with side rails adjusted as appropriate  - Keep care items and personal belongings within reach  - Initiate and maintain comfort rounds  - Make Fall Risk Sign visible to staff  - Offer Toileting every 2 Hours, in advance of need  - Initiate/Maintain bed alarm alarm  - Apply yellow socks and bracelet for high fall risk patients  - Consider moving patient to room near nurses station  Outcome: Progressing     Problem: DISCHARGE PLANNING  Goal: Discharge to home or other facility with appropriate resources  Description: INTERVENTIONS:  - Identify barriers to discharge w/patient and caregiver  - Arrange for needed discharge resources and transportation as appropriate  - Identify discharge learning needs (meds, wound care, etc )  - Refer to Case Management Department for coordinating discharge planning if the patient needs post-hospital services based on physician/advanced practitioner order or complex needs related to functional status, cognitive ability, or social support system  Outcome: Progressing     Problem: Knowledge Deficit  Goal: Patient/family/caregiver demonstrates understanding of disease process, treatment plan, medications, and discharge instructions  Description: Complete learning assessment and assess knowledge base  Interventions:  - Provide teaching at level of understanding  - Provide teaching via preferred learning methods  Outcome: Progressing     Problem: CARDIOVASCULAR - ADULT  Goal: Maintains optimal cardiac output and hemodynamic stability  Description: INTERVENTIONS:  - Monitor I/O, vital signs and rhythm  - Monitor for S/S and trends of decreased cardiac output  - Administer and titrate ordered vasoactive medications to optimize hemodynamic stability  - Assess quality of pulses, skin color and temperature  - Assess for signs of decreased coronary artery perfusion  - Instruct patient to report change in severity of symptoms  Outcome: Progressing  Goal: Absence of cardiac dysrhythmias or at baseline rhythm  Description: INTERVENTIONS:  - Continuous cardiac monitoring, vital signs, obtain 12 lead EKG if ordered  - Administer antiarrhythmic and heart rate control medications as ordered  - Monitor electrolytes and administer replacement therapy as ordered  Outcome: Progressing     Problem: Neurological Deficit  Goal: Neurological status is stable or improving  Description: Interventions:  - Monitor and assess patient's level of consciousness, motor function, sensory function, and level of assistance needed for ADLs  - Monitor and report changes from baseline  Collaborate with interdisciplinary team to initiate plan and implement interventions as ordered     - Provide and maintain a safe environment  - Consider seizure precautions  - Consider fall precautions  - Consider aspiration precautions  - Consider bleeding precautions  Outcome: Progressing     Problem: Activity Intolerance/Impaired Mobility  Goal: Mobility/activity is maintained at optimum level for patient  Description: Interventions:  - Assess and monitor patient  barriers to mobility and need for assistive/adaptive devices  - Assess patient's emotional response to limitations  - Collaborate with interdisciplinary team and initiate plans and interventions as ordered  - Encourage independent activity per ability   - Maintain proper body alignment  - Perform active/passive rom as tolerated/ordered  - Plan activities to conserve energy   - Turn patient as appropriate  Outcome: Progressing     Problem: Communication Impairment  Goal: Ability to express needs and understand communication  Description: Assess patient's communication skills and ability to understand information  Patient will demonstrate use of effective communication techniques, alternative methods of communication and understanding even if not able to speak  - Encourage communication and provide alternate methods of communication as needed  - Collaborate with case management/ for discharge needs  - Include patient/family/caregiver in decisions related to communication  Outcome: Progressing     Problem: Potential for Aspiration  Goal: Non-ventilated patient's risk of aspiration is minimized  Description: Assess and monitor vital signs, respiratory status, and labs (WBC)  Monitor for signs of aspiration (tachypnea, cough, rales, wheezing, cyanosis, fever)  - Assess and monitor patient's ability to swallow  - Place patient up in chair to eat if possible  - HOB up at 90 degrees to eat if unable to get patient up into chair   - Supervise patient during oral intake  - Instruct patient/ family to take small bites    - Instruct patient/ family to take small single sips when taking liquids  - Follow patient-specific strategies generated by speech pathologist   Outcome: Progressing     Problem: Nutrition  Goal: Nutrition/Hydration status is improving  Description: Monitor and assess patient's nutrition/hydration status for malnutrition (ex- brittle hair, bruises, dry skin, pale skin and conjunctiva, muscle wasting, smooth red tongue, and disorientation)  Collaborate with interdisciplinary team and initiate plan and interventions as ordered  Monitor patient's weight and dietary intake as ordered or per policy  Utilize nutrition screening tool and intervene per policy  Determine patient's food preferences and provide high-protein, high-caloric foods as appropriate  - Assist patient with eating   - Allow adequate time for meals   - Encourage patient to take dietary supplement as ordered  - Collaborate with clinical nutritionist   - Include patient/family/caregiver in decisions related to nutrition    Outcome: Progressing

## 2022-03-30 NOTE — PHYSICAL THERAPY NOTE
Physical Therapy Evaluation    Patient Name: Liat Hong    CFMNN'X Date: 3/30/2022     Problem List  Principal Problem:    Stroke-like symptoms  Active Problems:    Primary hypertension    Class 3 severe obesity due to excess calories with serious comorbidity and body mass index (BMI) of 45 0 to 49 9 in [facility])       Past Medical History  History reviewed  No pertinent past medical history  Past Surgical History  Past Surgical History:   Procedure Laterality Date    GALLBLADDER SURGERY  2008 03/30/22 1046   PT Last Visit   PT Visit Date 03/30/22   Note Type   Note type Evaluation   Pain Assessment   Pain Assessment Tool 0-10   Pain Score No Pain   Restrictions/Precautions   Weight Bearing Precautions Per Order No   Other Precautions Multiple lines   Home Living   Type of Home House   Home Layout Two level;Bed/bath upstairs;Stairs to enter with rails  (1+1 vs 6 SANTY)   Bathroom Shower/Tub Walk-in shower   Bathroom Toilet Standard   Bathroom Equipment Grab bars in shower   [address] [place] [address] Other (Comment)  (none)   Additional Comments pt denies use of DME at baseline   Prior Function   Level of [place] Independent with ADLs and functional mobility; Needs assistance with IADLs   Lives With Spouse   Receives Help From Family   ADL Assistance Independent   IADLs Needs assistance   Falls in the last 6 months 0   Vocational Retired   Comments spouse drives   General   Family/Caregiver Present No   Cognition   Overall Cognitive Status WFL   Arousal/Participation Alert   Orientation Level Oriented X4   Following Commands Follows all commands and directions without difficulty   Comments pt pleasant and cooperative   Subjective   Subjective "They said my heart looks healthy"   RUE Assessment   RUE Assessment WFL   LUE Assessment   LUE Assessment WFL   RLE Assessment   RLE Assessment WFL   LLE Assessment   LLE Assessment WFL   Vision-Basic Assessment   Current Vision Wears glasses all the time   Coordination   Movements are Fluid and Coordinated 1   Sensation WFL   Finger to Nose & Finger to Finger  Intact   Light Touch   RLE Light Touch Grossly intact   LLE Light Touch Grossly intact   Bed Mobility   Additional Comments pt OOB at start/end of session   Transfers   Sit to Stand 7  Independent   Stand to Sit 7  Independent   Additional Comments no device used   Ambulation/Elevation   Gait pattern WNL  (mildly decreased velocity)   Gait Assistance 7  Independent   Additional items Verbal cues   Assistive Device None   Distance 200'   Balance   Static Sitting Good   Dynamic Sitting Good   Static Standing Good   Dynamic Standing Good   Ambulatory Good   Endurance Deficit   Endurance Deficit No   Activity Tolerance   Activity Tolerance Patient tolerated treatment well   Assessment   Prognosis Good   Assessment Patient is a 80 y o  female evaluated by Physical Therapy s/p admit to [address] [place] [address] on 3/29/2022 with admitting diagnosis of: Stroke-like symptoms, Stroke-like symptom, Thyroid nodule incidentally noted on imaging study, and principal problem of: Stroke-like symptoms  PT was consulted to assess patient's functional mobility and discharge needs  Ordered are PT Evaluation and treatment with activity level of: up and OOB as tolerated  Comorbidities affecting patient's physical performance at time of assessment include: HTN, obesity  Personal factors affecting the patient at time of IE include: lives in 2 story home, step(s) to enter home and inability/difficulty performing IADLs  Please locate objective findings from PT assessment regarding body systems outlined above  Upon evaluation, pt able to perform all functional mobility independently without assistive device  Pt ambulated 200' without difficulty; demonstrating WFL balance, strength, endurance, and coordination   Pt has no concerns about her mobility at this time and only reports persistent facial droop  Continued PT intervention not indicated at this time; will d/c PT orders  The patient's AM-PAC Basic Mobility Inpatient Short Form Raw Score is 23  A Raw score of greater than 16 suggests the patient may benefit from discharge to home  Please also refer to the recommendation of the Physical Therapist for safe discharge planning  Goals   Patient Goals to go home   Recommendation   PT Discharge Recommendation No rehabilitation needs   AM-PAC Basic Mobility Inpatient   Turning in Bed Without Bedrails 4   Lying on Back to Sitting on Edge of Flat Bed 4   Moving Bed to Chair 4   Standing Up From Chair 4   Walk in Room 4   Climb 3-5 Stairs 3   Basic Mobility Inpatient Raw Score 23   Basic Mobility Standardized Score 50 88   Highest Level Of Mobility   JH-HLM Goal 7: Walk 25 feet or more   JH-HLM Highest Level of Mobility 7: Walk 25 feet or more   JH-HLM Goal Achieved Yes   End of Consult   Patient Position at End of Consult Bedside chair; All needs within reach

## 2022-03-30 NOTE — CONSULTS
INTERPROFESSIONAL (PHONE) Keri Hickey 80 y o  female MRN: [id number]  Encounter Date: 03/30/22      Reason for Consult / Principal Problem: Stroke like symptoms    Consulting Provider: Dr Liz Galeas   Requesting Provider: Ahmet Ross PA-C    80year old female with obesity and tobacco use presented to [facility] ED yesterday evening for evaluation of stroke like symptoms  Patient woke up yesterday morning and noted a right facial droop  She noted that water was dripping out of the right side of her mouth  A stroke alert was activated in the ED  Blood pressure was 204/122  NIHSS 1 for R facial droop, otherwise neurologic exam was noted to be unremarkable  CT/CTA unremarkable  Incidental thyroid nodules identified on CTA  She was outside of the time window so she was not a TPA candidate  She was loaded with aspirin 325 mg  She was not taking any medications prior to admission  Stroke workup thus far:   - Total cholesterol 244,   - Echocardiogram is in process   - Persistent HTN fluctuating from 986D-869L systolic    - Remainder of stroke pathway is pending, including MRI brain, Hemoglobin A1c, and therapy evaluations  ASSESSMENT:  New onset right-sided facial droop  Per records, this appears to have occurred in relative isolation  She does have risk factors for stroke (tobacco, obesity, dyslipidemia), and this should be investigated as is being done  However, cannot either discount possibility a Bell's palsy  RECOMMENDATIONS:  1  MRI brain-would add contrast and attention to right 7th cranial nerve  2  Agree with antiplatelet and atorvastatin  3  Beginning this evening, should be okay to discontinue permissive hypertension, and initiate antihypertensive regimen as is appropriate (defer to Medicine)  4   Neurology role remain available to advise pending results of evolving data     Total time spent in review of data, discussion with requesting provider and rendering advice was 21-30 Mins

## 2022-03-30 NOTE — OCCUPATIONAL THERAPY NOTE
Occupational Therapy         Patient Name: Brian CURIELDAlejaW Date: 3/30/2022       03/30/22 1117   OT Last Visit   OT Visit Date 03/30/22   Note Type   Note type Screen   Additional Comments pt evaluated by PT this AM and performing at independent level with no functional deficits identified; no OT needs; will complete OT orders at this time        Gerald Solorio OT

## 2022-03-30 NOTE — ED PROVIDER NOTES
Final Diagnosis:  1  Stroke-like symptoms    2  Thyroid nodule incidentally noted on imaging study    3  Primary hypertension    4  Dyslipidemia    5  Bell's palsy    6  Thyroid nodule        Chief Complaint   Patient presents with    CVA/TIA-like Symptoms     states she woke up this morning feeling normal, then shortly after started having a facial droop and slurred speech  HPI  Patient presents for evaluation of facial droop  History is provided by patient as well as  at bedside  They state that when the patient woke up this morning she noted that she was having some right-sided facial droop  She states that whenever she would drink water it would leak out of the right side of her mouth  She states that she was normal when she went to bed last night that was at approximately 4:00 a m  Suzi Jernigan Patient denies any falls or head pain  No use of blood thinners  Patient states that she is otherwise healthy and only takes allergy medicine at bedtime  Patient currently denies any nausea or vomiting, chest pain, palpitations, fever chills  Patient does not know the last time that she saw a physician  Denies any history of cardiac disease or blood clots  Unless otherwise specified:  - No language barrier    - History obtained from patient  - There are no limitations to the history obtained  - Previous charting was reviewed    PMH:   has no past medical history on file  PSH:   has a past surgical history that includes Gallbladder surgery (2008)  ROS:  Review of Systems   -   - 13 point ROS was performed and all are normal unless stated in the history above  - Nursing note reviewed  Vitals reviewed  - Orders placed by myself and/or advanced practitioner / resident          PE:   Vitals:    03/30/22 2255 03/31/22 0341 03/31/22 0708 03/31/22 1109   BP: (!) 177/105 (!) 169/105 (!) 164/103 (!) 157/102   BP Location: Left arm Left arm     Pulse: 85 85 95    Resp: 18 18 18    Temp: 98 1 °F (36 7 °C) 97 5 °F (36 4 °C) (!) 97 4 °F (36 3 °C)    TempSrc: Temporal Axillary     SpO2: 94% 93% 94%    Weight:       Height:         Vitals reviewed by me  Patient with right-sided facial droop as well tongue deviation to the right  Forehead muscles appear intact  Eye closure is symmetrical   Full range of motion of eyes, equal and reactive  Patient has 5/5 strength in upper and lower extremities  Sensation diffusely intact patient is very ticklish over abdomen in knees  Finger-to-nose good  No pronator drift  Unless otherwise specified above:    General: VS reviewed  Appears in NAD    Head: Normocephalic, atraumatic  Eyes: EOM-I  No exudate  ENT: Atraumatic external nose and ears  No malocclusion  No stridor  No drooling  Neck: No JVD  CV: No pallor noted  Lungs:   No tachypnea  No respiratory distress    Abdomen:  Soft, non-tender, non-distended    MSK:   FROM spontaneously  No obvious deformity    Skin: Dry, intact  No obvious rash  Neuro: Awake, alert, GCS15, CN II-XII grossly intact  Speaking in full sentences  Motor grossly intact  Psychiatric/Behavioral: Appropriate mood and affect   Exam: deferred    Physical Exam     CriticalCare Time  Performed by: Ayesha Umana MD  Authorized by: Ayesha Umana MD     Critical care provider statement:     Critical care time (minutes):  35    Critical care was necessary to treat or prevent imminent or life-threatening deterioration of the following conditions:  CNS failure or compromise    Critical care was time spent personally by me on the following activities:  Obtaining history from patient or surrogate, development of treatment plan with patient or surrogate, discussions with consultants, examination of patient, re-evaluation of patient's condition, ordering and review of radiographic studies and ordering and review of laboratory studies       A:  - Nursing note reviewed                     ED Course as of 03/31/22 4866 Tue Mar 29, 2022   2114 Procedure Note: EKG  Date/Time: 03/29/22 9:14 PM   Interpreted by: Kari Hayden  Indications / Diagnosis:  Stroke  ECG reviewed by me, the ED Provider: yes   The EKG demonstrates:  Rhythm: normal sinus  Intervals: normal intervals  Axis: normal axis  QRS/Blocks: normal QRS  ST Changes: No acute ST Changes, no STD/SANTY  No diffuse elevations to indicate pericarditis  No coved ST elevations greater than 2mm with negative T waves in V1-3 to indicate concern for brugada  No biphasic T waves in V2, V3 to indicate Wellens (critical stenosis of LAD)  No elevation in aVR or deviation when compared to V1 (can be associated with ST depression in I,II, V4-6 when left main occlusion is present)  MRI brain wo contrast   Final Result      No acute intracranial abnormality  Examination degraded by patient motion  Suspected mild chronic microangiopathic change  Workstation performed: [id number]         CT stroke alert brain   Final Result      No acute intracranial abnormality  Microangiopathic changes  I personally discussed this study with neurologist on 3/29/2022 at 9:22 PM                 Workstation performed: [id number]         CTA stroke alert (head/neck)   Final Result      No evidence of hemodynamic significant stenosis, aneurysm or dissection  1 8 cm left thyroid nodule  Incidental discovery of one or more thyroid nodule(s) measuring more than 1 5 cm and without suspicious features is noted in this patient who is above 28years old; according to guidelines published in the February 2015 white    paper on incidental thyroid nodules in the Journal of the [employer] of Radiology [facility]), further characterization with thyroid ultrasound is recommended                  I personally discussed this study with [address] [place] [address] on 3/29/2022 at 9:36 PM                             Workstation performed: [id number]         US thyroid    (Results Pending) Orders Placed This Encounter   Procedures    Critical Care    CT stroke alert brain    CTA stroke alert (head/neck)    MRI brain wo contrast    US thyroid    Basic metabolic panel    CBC and Platelet    Protime-INR    APTT    HS Troponin 0hr (reflex protocol)    NT-BNP PRO    Lipid Panel with Direct LDL reflex    Hemoglobin A1c w/EAG Estimation    Comprehensive metabolic panel    CBC and differential    Platelet count    TSH, 3rd generation with Free T4 reflex    Ambulatory referral to Neurology    Continuous cardiac monitoring    Continuous pulse oximetry    Fingerstick Glucose (POCT)    Misc nursing order (specify)    Consult to Neurology    Inpatient consult to Neurology    Inpatient consult to Case Management    Inpatient Consult to Nutrition Services    OT eval and treat    PT eval and treat    ECG 12 lead    Echo complete w/ contrast if indicated    Inpatient Admission    Discharge patient     Labs Reviewed   BASIC METABOLIC PANEL - Abnormal       Result Value Ref Range Status    Sodium 134 (*) 136 - 145 mmol/L Final    Potassium 3 8  3 5 - 5 3 mmol/L Final    Chloride 107  100 - 108 mmol/L Final    CO2 24  21 - 32 mmol/L Final    ANION GAP 3 (*) 4 - 13 mmol/L Final    BUN 13  5 - 25 mg/dL Final    Creatinine 0 96  0 60 - 1 30 mg/dL Final    Comment: Standardized to IDMS reference method    Glucose 115  65 - 140 mg/dL Final    Comment: If the patient is fasting, the ADA then defines impaired fasting glucose as > 100 mg/dL and diabetes as > or equal to 123 mg/dL  Specimen collection should occur prior to Sulfasalazine administration due to the potential for falsely depressed results  Specimen collection should occur prior to Sulfapyridine administration due to the potential for falsely elevated results      Calcium 9 0  8 3 - 10 1 mg/dL Final    eGFR 60  ml/min/1 73sq m Final    Narrative:     [place] guidelines for Chronic Kidney Disease (CKD):    Stage 1 with normal or high GFR (GFR > 90 mL/min/1 73 square meters)    Stage 2 Mild CKD (GFR = 60-89 mL/min/1 73 square meters)    Stage 3A Moderate CKD (GFR = 45-59 mL/min/1 73 square meters)    Stage 3B Moderate CKD (GFR = 30-44 mL/min/1 73 square meters)    Stage 4 Severe CKD (GFR = 15-29 mL/min/1 73 square meters)    Stage 5 End Stage CKD (GFR <15 mL/min/1 73 square meters)  Note: GFR calculation is accurate only with a steady state creatinine   CBC AND PLATELET - Abnormal    WBC 10 84 (*) 4 31 - 10 16 Thousand/uL Final    RBC 5 08  3 81 - 5 12 Million/uL Final    Hemoglobin 16 2 (*) 11 5 - 15 4 g/dL Final    Hematocrit 49 9 (*) 34 8 - 46 1 % Final    MCV 98  82 - 98 fL Final    MCH 31 9  26 8 - 34 3 pg Final    MCHC 32 5  31 4 - 37 4 g/dL Final    RDW 13 2  11 6 - 15 1 % Final    Platelets 960  869 - 390 Thousands/uL Final    MPV 10 2  8 9 - 12 7 fL Final   APTT - Abnormal    PTT 46 (*) 23 - 37 seconds Final    Comment: Therapeutic Heparin Range =  60-90 seconds   LIPID PANEL WITH DIRECT LDL REFLEX - Abnormal    Cholesterol 244 (*) See Comment mg/dL Final    Comment: Cholesterol:         Pediatric <18 Years        Desirable          <170 mg/dL      Borderline High    170-199 mg/dL      High               >=200 mg/dL        Adult >=18 Years            Desirable         <200 mg/dL      Borderline High   200-239 mg/dL      High              >239 mg/dL      Triglycerides 106  See Comment mg/dL Final    Comment: Triglyceride:     0-9Y            <75mg/dL     10Y-17Y         <90 mg/dL       >=18Y     Normal          <150 mg/dL     Borderline High 150-199 mg/dL     High            200-499 mg/dL        Very High       >499 mg/dL    Specimen collection should occur prior to N-Acetylcysteine or Metamizole administration due to the potential for falsely depressed results      HDL, Direct 42 (*) >=50 mg/dL Final    Comment: Specimen collection should occur prior to Metamizole administration due to the potential for sunita depressed results  LDL Calculated 181 (*) 0 - 100 mg/dL Final    Comment: LDL Cholesterol:     Optimal           <100 mg/dl     Near Optimal      100-129 mg/dl     Above Optimal       Borderline High 130-159 mg/dl       High            160-189 mg/dl       Very High       >189 mg/dl         This screening LDL is a calculated result  It does not have the accuracy of the Direct Measured LDL in the monitoring of patients with hyperlipidemia and/or statin therapy  Direct Measure LDL (BCK323) must be ordered separately in these patients  COMPREHENSIVE METABOLIC PANEL - Abnormal    Sodium 140  136 - 145 mmol/L Final    Potassium 3 8  3 5 - 5 3 mmol/L Final    Chloride 107  100 - 108 mmol/L Final    CO2 24  21 - 32 mmol/L Final    ANION GAP 9  4 - 13 mmol/L Final    BUN 11  5 - 25 mg/dL Final    Creatinine 0 91  0 60 - 1 30 mg/dL Final    Comment: Standardized to IDMS reference method    Glucose 130  65 - 140 mg/dL Final    Comment: If the patient is fasting, the ADA then defines impaired fasting glucose as > 100 mg/dL and diabetes as > or equal to 123 mg/dL  Specimen collection should occur prior to Sulfasalazine administration due to the potential for falsely depressed results  Specimen collection should occur prior to Sulfapyridine administration due to the potential for falsely elevated results  Calcium 9 0  8 3 - 10 1 mg/dL Final    Corrected Calcium 9 6  8 3 - 10 1 mg/dL Final    AST 21  5 - 45 U/L Final    Comment: Specimen collection should occur prior to Sulfasalazine administration due to the potential for falsely depressed results  ALT 33  12 - 78 U/L Final    Comment: Specimen collection should occur prior to Sulfasalazine administration due to the potential for falsely depressed results       Alkaline Phosphatase 123 (*) 46 - 116 U/L Final    Total Protein 7 4  6 4 - 8 2 g/dL Final    Albumin 3 2 (*) 3 5 - 5 0 g/dL Final    Total Bilirubin 0 38  0 20 - 1 00 mg/dL Final    Comment: Use of this assay is not recommended for patients undergoing treatment with eltrombopag due to the potential for falsely elevated results      eGFR 64  ml/min/1 73sq m Final    Narrative:     National Kidney Disease Foundation guidelines for Chronic Kidney Disease (CKD):     Stage 1 with normal or high GFR (GFR > 90 mL/min/1 73 square meters)    Stage 2 Mild CKD (GFR = 60-89 mL/min/1 73 square meters)    Stage 3A Moderate CKD (GFR = 45-59 mL/min/1 73 square meters)    Stage 3B Moderate CKD (GFR = 30-44 mL/min/1 73 square meters)    Stage 4 Severe CKD (GFR = 15-29 mL/min/1 73 square meters)    Stage 5 End Stage CKD (GFR <15 mL/min/1 73 square meters)  Note: GFR calculation is accurate only with a steady state creatinine   CBC AND DIFFERENTIAL - Abnormal    WBC 10 53 (*) 4 31 - 10 16 Thousand/uL Final    RBC 4 99  3 81 - 5 12 Million/uL Final    Hemoglobin 15 9 (*) 11 5 - 15 4 g/dL Final    Hematocrit 47 0 (*) 34 8 - 46 1 % Final    MCV 94  82 - 98 fL Final    MCH 31 9  26 8 - 34 3 pg Final    MCHC 33 8  31 4 - 37 4 g/dL Final    RDW 13 2  11 6 - 15 1 % Final    MPV 9 9  8 9 - 12 7 fL Final    Platelets 318  376 - 390 Thousands/uL Final    nRBC 0  /100 WBCs Final    Neutrophils Relative 60  43 - 75 % Final    Immat GRANS % 0  0 - 2 % Final    Lymphocytes Relative 27  14 - 44 % Final    Monocytes Relative 10  4 - 12 % Final    Eosinophils Relative 2  0 - 6 % Final    Basophils Relative 1  0 - 1 % Final    Neutrophils Absolute 6 36  1 85 - 7 62 Thousands/µL Final    Immature Grans Absolute 0 04  0 00 - 0 20 Thousand/uL Final    Lymphocytes Absolute 2 87  0 60 - 4 47 Thousands/µL Final    Monocytes Absolute 1 03  0 17 - 1 22 Thousand/µL Final    Eosinophils Absolute 0 18  0 00 - 0 61 Thousand/µL Final    Basophils Absolute 0 05  0 00 - 0 10 Thousands/µL Final   PROTIME-INR - Normal    Protime 12 4  11 6 - 14 5 seconds Final    INR 0 96  0 84 - 1 19 Final   HS TROPONIN I 0HR - Normal    hs TnI 0hr 3  "Refer to ACS Flowchart"- see link ng/L Final    Comment:                                              Initial (time 0) result  If >=50 ng/L, Myocardial injury suggested ;  Type of myocardial injury and treatment strategy  to be determined  If 5-49 ng/L, a delta result at 2 hours and or 4 hours will be needed to further evaluate  If <4 ng/L, and chest pain has been >3 hours since onset, patient may qualify for discharge based on the HEART score in the ED  If <5 ng/L and <3hours since onset of chest pain, a delta result at 2 hours will be needed to further evaluate  HS Troponin 99th Percentile URL of a Health Population=12 ng/L with a 95% Confidence Interval of 8-18 ng/L  Second Troponin (time 2 hours)  If calculated delta >= 20 ng/L,  Myocardial injury suggested ; Type of myocardial injury and treatment strategy to be determined  If 5-49 ng/L and the calculated delta is 5-19 ng/L, consult medical service for evaluation  Continue evaluation for ischemia on ecg and other possible etiology and repeat hs troponin at 4 hours  If delta is <5 ng/L at 2 hours, consider discharge based on risk stratification via the HEART score (if in ED), or MARK risk score in IP/Observation  HS Troponin 99th Percentile URL of a Health Population=12 ng/L with a 95% Confidence Interval of 8-18 ng/L  NT-BNP PRO (BRAIN NATRIURETIC PEPTIDE) - Normal    NT-proBNP 98  <125 pg/mL Final   PLATELET COUNT - Normal    Platelets 964  165 - 390 Thousands/uL Final    MPV 10 5  8 9 - 12 7 fL Final   POCT GLUCOSE - Normal    POC Glucose 107  65 - 140 mg/dl Final         Final Diagnosis:  1  Stroke-like symptoms    2  Thyroid nodule incidentally noted on imaging study    3  Primary hypertension    4  Dyslipidemia    5  Bell's palsy    6  Thyroid nodule        P:  - patient presents with facial droop  Stroke alert called  Patient is out of window for any intervention  Low NIH score    Will perform CT imaging and anticipate admission for further evaluation   -when patient was moved to the CT machine she stated that she has shortness of breath whenever she lays flat  She states that this has been going on for an extended period of time  Eventually patient was able to be placed on supplementary oxygen and lay on her side for the scan to be completed  -no acute findings on CT imaging  Patient admits stroke pathway  Medications   aspirin tablet 325 mg (325 mg Oral Given 3/29/22 2153)   iohexol (OMNIPAQUE) 350 MG/ML injection (SINGLE-DOSE) 85 mL (85 mL Intravenous Given 3/29/22 2129)   LORazepam (ATIVAN) injection 0 5 mg (0 5 mg Intravenous Given 3/30/22 1204)   famotidine (PEPCID) tablet 20 mg (20 mg Oral Given 3/30/22 2334)     Time reflects when diagnosis was documented in both MDM as applicable and the Disposition within this note     Time User Action Codes Description Comment    3/29/2022  8:49 PM Dom Duet Add [R29 90] Stroke-like symptoms     3/29/2022 10:54 PM Virgia Clines Add [E04 1] Thyroid nodule incidentally noted on imaging study     3/29/2022 10:55 PM Rosalynd Picket [E04 1] Thyroid nodule incidentally noted on imaging study 1 8 cm left thyroid nodule    3/29/2022 10:56 PM Virgia Clines Modify [E04 1] Thyroid nodule incidentally noted on imaging study 1 8 cm left thyroid nodule (noted 3/29/22)  Follow up ultrasound of thyroid recommended  3/31/2022 11:41 AM Marzetta Imani Add [I10] Primary hypertension     3/31/2022 11:41 AM Marzetta Imani Add [E78 5] Dyslipidemia     3/31/2022 11:41 AM Marzetta Imani Add [G51 0] Bell's palsy     3/31/2022 11:43 AM Marzetta Imani Add [E04 1] Thyroid nodule       ED Disposition     ED Disposition Condition Date/Time Comment    Admit Stable Tue Mar 29, 2022 10:58 PM Case was discussed with GIDEON and the patient's admission status was agreed to be Admission Status: inpatient status to the service of Dr Kiley Poole            Follow-up Information     Follow up With Specialties Details Why Contact Info Additional Information    [facility] Follow up in 2 week(s)  [address] [facility], [address] [place], [facility], [facility]    [address] [facility] Follow up  [address] [facility] [id number]  [address] [facility],  [address] 309 [facility], [address] [place] [address], [address] [place], [place], [address] [facility]        Discharge Medication List as of 3/31/2022 12:25 PM      START taking these medications    Details   amLODIPine (NORVASC) 5 mg tablet Take 1 tablet (5 mg total) by mouth daily, Starting Fri 4/1/2022, Normal      artificial tear (LUBRIFRESH P M ) 83-15 % ophthalmic ointment Administer to the right eye daily at bedtime, Starting Thu 3/31/2022, Normal      atorvastatin (LIPITOR) 40 mg tablet Take 1 tablet (40 mg total) by mouth every evening, Starting Thu 3/31/2022, Normal      metoprolol succinate (TOPROL-XL) 25 mg 24 hr tablet Take 1 tablet (25 mg total) by mouth daily, Starting Fri 4/1/2022, Normal      predniSONE 20 mg tablet Take 2 tablets (40 mg total) by mouth daily for 7 days, Starting Thu 3/31/2022, Until Thu 4/7/2022, Normal      valACYclovir (VALTREX) 1,000 mg tablet Take 1 tablet (1,000 mg total) by mouth every 8 (eight) hours for 7 days, Starting Thu 3/31/2022, Until Thu 4/7/2022, Normal           Outpatient Discharge Orders   US thyroid   Standing Status: Future Standing Exp  Date: 03/31/26     None       Portions of the record may have been created with voice recognition software  Occasional wrong word or "sound a like" substitutions may have occurred due to the inherent limitations of voice recognition software  Read the chart carefully and recognize, using context, where substitutions have occurred      Electronically signed by:  MD Dell Conklin Jay Jay Rasmussen MD  03/31/22 9413

## 2022-03-30 NOTE — MALNUTRITION/BMI
This medical record morbid obesity  BMI Findings:  Adult BMI Classifications: Morbid Obesity 45-49 9     Diet education provided on cardiac, low cholesterol diet  Body mass index is 45 11 kg/m²  See Nutrition note dated 3/30/2022 for additional details  Completed nutrition assessment is viewable in the nutrition documentation

## 2022-03-30 NOTE — ED NOTES
Pt reports not able to breath during CT  Pt was not able to tolerate lying flat in supine position during testing, ER provider was made aware and is at CT bedside  O2 6L/m via N/C applied during testing  Pt tolerated well with lying in right side fetal position       Ron Hirsch RN  03/29/22 3445

## 2022-03-31 VITALS
HEART RATE: 95 BPM | WEIGHT: 288 LBS | HEIGHT: 67 IN | SYSTOLIC BLOOD PRESSURE: 157 MMHG | DIASTOLIC BLOOD PRESSURE: 102 MMHG | BODY MASS INDEX: 45.2 KG/M2 | OXYGEN SATURATION: 94 % | RESPIRATION RATE: 18 BRPM | TEMPERATURE: 97.4 F

## 2022-03-31 PROBLEM — R73.03 PREDIABETES: Status: ACTIVE | Noted: 2022-03-31

## 2022-03-31 PROBLEM — E78.5 DYSLIPIDEMIA: Status: ACTIVE | Noted: 2022-03-31

## 2022-03-31 LAB — TSH SERPL DL<=0.05 MIU/L-ACNC: 1.37 UIU/ML (ref 0.36–3.74)

## 2022-03-31 PROCEDURE — 99239 HOSP IP/OBS DSCHRG MGMT >30: CPT | Performed by: INTERNAL MEDICINE

## 2022-03-31 RX ORDER — VALACYCLOVIR HYDROCHLORIDE 1 G/1
1000 TABLET, FILM COATED ORAL EVERY 8 HOURS SCHEDULED
Qty: 21 TABLET | Refills: 0 | Status: SHIPPED | OUTPATIENT
Start: 2022-03-31 | End: 2022-04-11

## 2022-03-31 RX ORDER — MINERAL OIL AND PETROLATUM 150; 830 MG/G; MG/G
OINTMENT OPHTHALMIC
Qty: 3.5 G | Refills: 0 | Status: SHIPPED | OUTPATIENT
Start: 2022-03-31

## 2022-03-31 RX ORDER — ATORVASTATIN CALCIUM 40 MG/1
40 TABLET, FILM COATED ORAL EVERY EVENING
Qty: 30 TABLET | Refills: 0 | Status: SHIPPED | OUTPATIENT
Start: 2022-03-31

## 2022-03-31 RX ORDER — AMLODIPINE BESYLATE 5 MG/1
5 TABLET ORAL DAILY
Qty: 30 TABLET | Refills: 0 | Status: SHIPPED | OUTPATIENT
Start: 2022-04-01

## 2022-03-31 RX ORDER — PREDNISONE 20 MG/1
40 TABLET ORAL DAILY
Qty: 14 TABLET | Refills: 0 | Status: SHIPPED | OUTPATIENT
Start: 2022-03-31 | End: 2022-04-07

## 2022-03-31 RX ORDER — METOPROLOL SUCCINATE 25 MG/1
25 TABLET, EXTENDED RELEASE ORAL DAILY
Qty: 30 TABLET | Refills: 0 | Status: SHIPPED | OUTPATIENT
Start: 2022-04-01

## 2022-03-31 RX ORDER — METOPROLOL SUCCINATE 25 MG/1
25 TABLET, EXTENDED RELEASE ORAL DAILY
Status: DISCONTINUED | OUTPATIENT
Start: 2022-03-31 | End: 2022-03-31 | Stop reason: HOSPADM

## 2022-03-31 RX ADMIN — METOPROLOL SUCCINATE 25 MG: 25 TABLET, EXTENDED RELEASE ORAL at 11:26

## 2022-03-31 RX ADMIN — HEPARIN SODIUM 5000 UNITS: 5000 INJECTION INTRAVENOUS; SUBCUTANEOUS at 05:11

## 2022-03-31 RX ADMIN — AMLODIPINE BESYLATE 5 MG: 5 TABLET ORAL at 09:06

## 2022-03-31 RX ADMIN — PANTOPRAZOLE SODIUM 40 MG: 40 TABLET, DELAYED RELEASE ORAL at 05:11

## 2022-03-31 RX ADMIN — VALACYCLOVIR HYDROCHLORIDE 1000 MG: 500 TABLET, FILM COATED ORAL at 05:11

## 2022-03-31 NOTE — CASE MANAGEMENT
Case Management Discharge Planning Note    Patient name Reddy Perry  Location /274-03 MRN [id number]  : 1950 Date 3/31/2022       Current Admission Date: 3/29/2022  Current Admission Diagnosis:Stroke-like symptoms   Patient Active Problem List    Diagnosis Date Noted    Prediabetes 2022    Dyslipidemia 2022    Stroke-like symptoms 2022    Primary hypertension 2022    Class 3 severe obesity due to excess calories with serious comorbidity and body mass index (BMI) of 45 0 to 49 9 in adult [facility]) 2022    Thyroid nodule 2022      LOS (days): 2  Geometric Mean LOS (GMLOS) (days): 3 00  Days to GMLOS:1 4     OBJECTIVE:  Risk of Unplanned Readmission Score: 9         Current admission status: Inpatient   Preferred Pharmacy:   [id number] [address] [place] [address]  Phone: [phone] Fax: [address] [place] [address] [address] [place] [address]  Phone: [phone] Fax: [phone]    Primary Care Provider: No primary care provider on file  Primary Insurance: [address] [place] [address] REP  Secondary Insurance: Jermain Wilkinson DETAILS:Pt is being dc'd home on this date with OP follow up  Pt has an appointment with PCP Priscilla Jones for  and will also be following up with Neurology as an outpatient

## 2022-03-31 NOTE — ASSESSMENT & PLAN NOTE
Significantly elevated systolic blood pressures, initially was allowed to maintain permissive hypertension in case of new CVA, ruled out by MRI  · Initiate on amlodipine today and monitor BP  · Consider addition of low-dose beta-blocker if needed as well

## 2022-03-31 NOTE — ASSESSMENT & PLAN NOTE
Significantly elevated systolic blood pressures, initially was allowed to maintain permissive hypertension in case of new CVA, ruled out by MRI  · Improved with initiation of amlodipine but not at goal   · Heart rates in the 80 to 90s - will also initiate low-dose BB as well

## 2022-03-31 NOTE — ASSESSMENT & PLAN NOTE
Presented with right-sided facial droop, sensation of water dripping out of the right side of her mouth  Patient exhibits no other focal neurologic deficits  CT of the head was initially negative, with subsequent MRI of the brain negative as well - per Neurology, MRI was delayed long enough that it would have identified a new stroke  Believes symptoms to represent Bell's palsy  · Initiated on prednisone and valacyclovir  · Difficulty with closing of right eye as well - initiate on artificial tears ointment, with instructions to taper eye shut at night  · Plan on outpatient follow-up with Neurology

## 2022-03-31 NOTE — DISCHARGE INSTR - AVS FIRST PAGE
Please see your primary care provider within 1 week of discharge  He should follow-up as an outpatient with the neurologists  Please perform a thyroid ultrasound for a small thyroid nodule discovered incidentally while your hospitalized  Please taper your right eye shot during times of sleep as we discussed  Please  your medications and complete them as prescribed  He should monitor your blood pressure at home, and take these values with you your primary care provider for follow-up

## 2022-03-31 NOTE — ASSESSMENT & PLAN NOTE
Although CVA has been ruled out, will benefit from statin therapy  Continue atorvastatin  ER Documentation


Chief Complaint


Chief Complaint





AP X 2 0 DAYS





HPI


This is a 60-year-old female with a history of breast cancer who is here for 2 


months history of mid abdominal pain.  She says that she is at the pain for 2 


months now and has had a back in December 2018 for 2 months at that time as 


well.  She says nothing makes the pain worse or better the patient's describing 


the pain is an ache, no nausea vomiting diarrhea weight loss no cough shortness 


of breath dysuria





ROS


All systems reviewed and are negative except as per history of present illness.





Medications


Home Meds


Active Scripts


Mag Hydrox/Al Hydrox/Simeth (Maalox Plus X-Strength Susp) 355 Ml Oral.susp, 355 


ML PO BID, #1


   Prov:PAYTON HORN MD         12/31/18


Pantoprazole* (Protonix*) 40 Mg Tablet.dr, 40 MG PO DAILY, #20 TAB


   Prov:PAYTON HORN MD         12/31/18





Allergies


Allergies:  


Coded Allergies:  


     acetaminophen (Unverified  Allergy, Unknown, 12/31/18)


     ciprofloxacin (Unverified  Allergy, Unknown, 12/31/18)


     hydrocodone (Unverified  Allergy, Unknown, 12/31/18)


     sulfamethoxazole (Unverified  Allergy, Unknown, 12/31/18)


     trimethoprim (Unverified  Allergy, Unknown, 12/31/18)





PMhx/Soc


History of Surgery:  Yes (RIGHT BREAST MASTECTOMY)


Anesthesia Reaction:  No


Hx Neurological Disorder:  No


Hx Respiratory Disorders:  No


Hx Cardiac Disorders:  No


Hx Psychiatric Problems:  No


Hx Miscellaneous Medical Probl:  No


Hx Alcohol Use:  No


Hx Substance Use:  No


Hx Tobacco Use:  No


Smoking Status:  Current some day smoker





FmHx


Family History:  No coronary disease





Physical Exam


Vitals





Vital Signs


  Date      Temp  Pulse  Resp  B/P (MAP)   Pulse Ox  O2          O2 Flow    FiO2


Time                                                 Delivery    Rate


    5/5/19           74    16      143/66       100  Room Air


     19:00                           (91)


    5/5/19           70    14      144/61       100  Room Air


     18:30                           (88)


    5/5/19           75    17      136/68       100  Room Air


     16:25                           (90)


    5/5/19  98.3     76    18      139/62        99


     11:51                           (87)





Physical Exam


 Const:      Well-developed, well-nourished


 Head:        Atraumatic, normocephalic


 Eyes:       Normal Conjunctiva, PERRLA, EOMI, normal sclera, no nystagmus


 ENT:         Normal External Ears, Nose and Mouth, moist mucus membranes.


 Neck:        Full range of motion.  No meningismus, no lymphadenopathy.


 Resp:         Clear to auscultation bilaterally, no wheezing, rhonchi, rales


 Cardio:       Regular rate and rhythm, no murmurs, S1 S2 present


 Abd:         Soft, minimal tenderness in the mid abdomen, non distended. Normal


bowel sounds, no guarding or rebound, no pulsitile abdominal masses or bruits


 Skin:         No petechiae or rashes, no ecchymosis , no maculopapular rash


 Back:        No midline or flank tenderness


 Ext:          No cyanosis, or edema, FROM x 4, normal inspection, 


neurovascularly intact x 4


 Neur:        Awake and alert, STR 5/5 x 4, sensation intact x 4, no focal 


findings, cerebellum intact


 Psych:        Normal Mood and Affect


Result Diagram:  


5/5/19 1357                                                                     


          5/5/19 1357





Results 24 hrs





Laboratory Tests


       Test
                                5/5/19
13:48    5/5/19
13:57


       Bedside Urine pH (LAB)                       5.5


       Bedside Urine Protein (LAB)          Negative


       Bedside Urine Glucose (UA)           Negative


       Bedside Urine Ketones (LAB)          Negative


       Bedside Urine Blood                  Negative


       Bedside Urine Nitrite (LAB)          Negative


       Bedside Urine Leukocyte
Esterase (L  Trace 
       



       White Blood Count                                    5.6 10^3/ul


       Red Blood Count                                     4.35 10^6/ul


       Hemoglobin                                             13.0 g/dl


       Hematocrit                                                39.8 %


       Mean Corpuscular Volume                                  91.5 fl


       Mean Corpuscular Hemoglobin                              29.9 pg


       Mean Corpuscular Hemoglobin
Concent  
                32.7 g/dl 



       Red Cell Distribution Width                               13.1 %


       Platelet Count                                       206 10^3/UL


       Mean Platelet Volume                                     10.2 fl


       Immature Granulocytes %                                  0.200 %


       Neutrophils %                                             53.5 %


       Lymphocytes %                                             32.3 %


       Monocytes %                                               10.6 %


       Eosinophils %                                              2.9 %


       Basophils %                                                0.5 %


       Nucleated Red Blood Cells %                          0.0 /100WBC


       Immature Granulocytes #                            0.010 10^3/ul


       Neutrophils #                                        3.0 10^3/ul


       Lymphocytes #                                        1.8 10^3/ul


       Monocytes #                                          0.6 10^3/ul


       Eosinophils #                                        0.2 10^3/ul


       Basophils #                                          0.0 10^3/ul


       Nucleated Red Blood Cells #                          0.0 10^3/ul


       Sodium Level                                          142 mmol/L


       Potassium Level                                       4.8 mmol/L


       Chloride Level                                        106 mmol/L


       Carbon Dioxide Level                                   30 mmol/L


       Anion Gap                                                      6


       Blood Urea Nitrogen                                     14 mg/dl


       Creatinine                                            0.69 mg/dl


       Est Glomerular Filtrat Rate
mL/min   
             > 60 mL/min 



       Glucose Level                                          101 mg/dl


       Calcium Level                                         10.4 mg/dl


       Total Bilirubin                                        0.4 mg/dl


       Direct Bilirubin                                      0.00 mg/dl


       Indirect Bilirubin                                     0.4 mg/dl


       Aspartate Amino Transf
(AST/SGOT)    
                  22 IU/L 



       Alanine Aminotransferase
(ALT/SGPT)  
                  14 IU/L 



       Alkaline Phosphatase                                     53 IU/L


       Total Protein                                           7.7 g/dl


       Albumin                                                 4.2 g/dl


       Globulin                                               3.50 g/dl


       Albumin/Globulin Ratio                                      1.20


       Lipase                                                   154 U/L





Current Medications


 Medications
   Dose
          Sig/Brisa
       Start Time
   Status  Last


 (Trade)       Ordered        Route
 PRN     Stop Time              Admin
Dose


                              Reason                                Admin


 Morphine       4 mg           ONCE  STAT
    5/5/19        DC            5/5/19


Sulfate
                      IV
            13:43
 5/5/19                14:23



(morphine)                                   13:45


 Ondansetron    4 mg           ONCE  STAT
    5/5/19        DC            5/5/19


HCl
  (Zofran                 IV
            13:43
 5/5/19                14:22



Inj)                                         13:45


 Iohexol
       150 ml         STK-MED        5/5/19        DC            5/5/19


(Omnipaque                    ONCE
 .ROUTE
  14:52
 5/5/19                15:25



300mg/
 ml)                                  14:53


 Sodium         100 ml @ ud    STK-MED        5/5/19        DC            5/5/19


Chloride                      ONCE
 .ROUTE
  14:52
 5/5/19                15:25



                                             14:53








Procedures/MDM


CT scan is not populating into the system so a fax report was sent:





There is a small amount of free fluid in the pelvis





There is a 2 cm enhancing lesion in the posterior pelvis which may represent a f


ibroid and a retroflexed uterus.  However, the uterus is not well delineated and


unsure it if it is an atrophic or surgically absent.





Punctate calcification of liver is likely related to old granulomatous disease





There is a 5 mm low-attenuation lesion in the left hepatic lobe which is too 


small to characterize however statistically likely represents a cyst











Patient has chronic abdominal pain for several months now with uncertain 


etiology.  I discussed this with her that she needs to follow-up with GI as this


may be a gastrointestinal process and to follow-up with her primary on the 


hepatic lobe lesion and the 2 cm enhancing lesion in the posterior pelvis which 


require MRI likely.





I gave her a copy of her CAT scan report.











We will discharge home with pain medication and GI follow-up





Departure


Diagnosis:  


   Primary Impression:  


   Abdominal pain


   Abdominal location:  lower abdomen, unspecified  Qualified Codes:  R10.30 - 


   Lower abdominal pain, unspecified


Condition:  Stable











LUDIN MILES DO          May 5, 2019 16:23

## 2022-03-31 NOTE — DISCHARGE SUMMARY
[facility] [address]  Discharge- Valrey Lew 1950, 80 y o  female MRN: [id number]  Unit/Bed#: 401-01 Encounter: [id number]  Primary Care Provider: No primary care provider on file  Date and time admitted to hospital: 3/29/2022  8:34 PM    * Stroke-like symptoms  Assessment & Plan  Presented with right-sided facial droop, sensation of water dripping out of the right side of her mouth  Patient exhibits no other focal neurologic deficits  CT of the head was initially negative, with subsequent MRI of the brain negative as well - per Neurology, MRI was delayed long enough that it would have identified a new stroke  Believes symptoms to represent Bell's palsy  · Initiated on prednisone and valacyclovir - will continue for planned 1 week course  · Difficulty with closing of right eye - continue on artificial tears ointment, with instructions to tape eye shut at night and while asleep  · Plan on outpatient follow-up with Neurology  Primary hypertension  Assessment & Plan  Significantly elevated systolic blood pressures, initially was allowed to maintain permissive hypertension in case of new CVA, ruled out by MRI  · Improved with initiation of amlodipine but not at goal   · Heart rates in the 80 to 90s - will also initiate low-dose BB as well  Dyslipidemia  Assessment & Plan    Although CVA has been ruled out, will benefit from statin therapy  Continue atorvastatin  Prediabetes  Assessment & Plan  HgA1c 5 8%  Will need to focus on lifestyle modifications and dietary changes  Class 3 severe obesity due to excess calories with serious comorbidity and body mass index (BMI) of 45 0 to 49 9 in adult [facility])  Assessment & Plan  History noted  Thyroid nodule  Assessment & Plan  CT with incidentally noted 1 8 cm left thyroid nodule - recommendations for further characterization with thyroid ultrasound recommended       Will order thyroid ultrasound to be performed as outpatient  TSH added on to morning labs, but not yet returned at time of discharge  Medical Problems             Resolved Problems  Date Reviewed: 3/31/2022    None              Discharging Physician / Practitioner: Lotus Homans, MD  PCP: No primary care provider on file  Admission Date:   Admission Orders (From admission, onward)     Ordered        03/29/22 2258  Inpatient Admission  Once                      Discharge Date: 03/31/22    Consultations During Hospital Stay:  · Neurology    Procedures Performed:   · None    Significant Findings / Test Results:   MRI brain wo contrast    Result Date: 3/30/2022  Impression: No acute intracranial abnormality  Examination degraded by patient motion  Suspected mild chronic microangiopathic change  Workstation performed: [id number]     CT stroke alert brain    Result Date: 3/29/2022  Impression: No acute intracranial abnormality  Microangiopathic changes  I personally discussed this study with neurologist on 3/29/2022 at 9:22 PM   Workstation performed: [id number]     CTA stroke alert (head/neck)    Result Date: 3/29/2022  Impression: No evidence of hemodynamic significant stenosis, aneurysm or dissection  1 8 cm left thyroid nodule  Incidental discovery of one or more thyroid nodule(s) measuring more than 1 5 cm and without suspicious features is noted in this patient who is above 28years old; according to guidelines published in the February 2015 white paper on incidental thyroid nodules in the Journal of the [employer] of Radiology Merceda Rater), further characterization with thyroid ultrasound is recommended  I personally discussed this study with [address] [place] [address] on 3/29/2022 at 9:36 PM   Workstation performed: [id number]     ECHO:    Result Date: 3/29/2022  Interpretation Summary         Left Ventricle: Left ventricular cavity size is normal  Wall thickness is increased  The left ventricular ejection fraction is 65%   Systolic function is normal  Wall motion is normal  There is mild concentric hypertrophy    Mitral Valve: There is trace regurgitation    Tricuspid Valve: There is trace regurgitation  Incidental Findings:   · As above     Test Results Pending at Discharge (will require follow up):   · TSH     Outpatient Tests Requested:  · Thyroid ultrasound    Complications:  None    Reason for Admission:  Stroke-like symptoms    HPI from original H&P dated 03/29/2022:     Brian Ma is a 80 y o  female with a PMH of thyroid nodule  who presents with stroke-like symptoms Times 12 hours  Patient states that when she woke up this morning, she noticed a facial droop and some slurred speech  She states that the drip is on her right side  She also states that when she would try to drink water with the catheter right side of her mouth  She states that she was normal when she went to bed well last night which is approximately at 4:00 a m  Ricco Deshawn She denies any recent falls, head pain  She does not take any blood thinners or any other medications  She states that other than this current episode she is generally healthy  She denies any current nausea, vomiting, chest pain, palpitations, fever, chills, recent illness, arm or leg weakness  She does not follow with a PCP regularly  Patient receive CT scan of the head  Negative for intracranial hemorrhage  ED discussed with neurology who recommended admission on stroke pathway with aspirin 325 mg an MRI in a m  Ricco [place] Please see above list of diagnoses and related plan for additional information       Condition at Discharge: stable    Discharge Day Visit / Exam:     Vitals: Blood Pressure: (!) 157/102 (03/31/22 1109)  Pulse: 95 (03/31/22 0708)  Temperature: (!) 97 4 °F (36 3 °C) (03/31/22 0708)  Temp Source: Axillary (03/31/22 0341)  Respirations: 18 (03/31/22 0708)  Height: 5' 7" (170 2 cm) (03/30/22 0905)  Weight - Scale: 131 kg (288 lb) (03/30/22 0905)  SpO2: 94 % (03/31/22 0708)    Discussion with Family: Attempted to update  () via phone  Left voicemail  Discharge instructions/Information to patient and family:   See after visit summary for information provided to patient and family  Provisions for Follow-Up Care:  See after visit summary for information related to follow-up care and any pertinent home health orders  Disposition:   Home    Planned Readmission: No     Discharge Statement:  I spent 35 minutes discharging the patient  This time was spent on the day of discharge  I had direct contact with the patient on the day of discharge  Greater than 50% of the total time was spent examining patient, answering all patient questions, arranging and discussing plan of care with patient as well as directly providing post-discharge instructions  Additional time then spent on discharge activities  Discharge Medications:  See after visit summary for reconciled discharge medications provided to patient and/or family        **Please Note: This note may have been constructed using a voice recognition system**

## 2022-03-31 NOTE — INCIDENTAL FINDINGS
The following findings require follow up:  Radiographic finding   Finding:  Thyroid nodule   Follow up required:  Thyroid ultrasound   Follow up should be done within 1 week(s)    Please notify the following clinician to assist with the follow up:   Dr Yanira Kendall

## 2022-03-31 NOTE — ASSESSMENT & PLAN NOTE
CT with incidentally noted 1 8 cm left thyroid nodule - recommendations for further characterization with thyroid ultrasound recommended  Will order thyroid ultrasound to be performed as outpatient  TSH added on to morning labs, but not yet returned at time of discharge

## 2022-03-31 NOTE — CASE MANAGEMENT
Case Management Assessment & Discharge Planning Note    Patient name Anna Weir  Location /182-53 MRN [id number]  : 1950 Date 3/31/2022       Current Admission Date: 3/29/2022  Current Admission Diagnosis:Stroke-like symptoms   Patient Active Problem List    Diagnosis Date Noted    Prediabetes 2022    Stroke-like symptoms 2022    Primary hypertension 2022    Class 3 severe obesity due to excess calories with serious comorbidity and body mass index (BMI) of 45 0 to 49 9 in adult [facility]) 2022    Thyroid nodule incidentally noted on imaging study 2022      LOS (days): 2  Geometric Mean LOS (GMLOS) (days): 3 00  Days to GMLOS:1 5     OBJECTIVE:    Risk of Unplanned Readmission Score: 8      Current admission status: Inpatient       Preferred Pharmacy:   [id number] [address] [place] [address]  Phone: [phone] Fax: [address] [place] [address] [address] [place] [address]  Phone: [phone] Fax: [phone]    Primary Care Provider: No primary care provider on file      Primary Insurance: MEDICARE  Secondary Insurance:     ASSESSMENT:  Active Health Care Proxies     [facility] [employer] Representative - Spouse   Primary Phone: [phone] (Home)               Advance Directives  Does patient have a [address]?: No  Was patient offered paperwork?: Yes (declines)  Does patient currently have a Health Care decision maker?: Yes, please see Health Care Proxy section  Does patient have Advance Directives?: No  Was patient offered paperwork?: Yes (declines)  Primary Contact: Isauro Salvador (Spouse) [phone] (H)         Readmission Root Cause  30 Day Readmission: No    Patient Information  Admitted from[de-identified] Home  Mental Status: Alert  During Assessment patient was accompanied by: Not accompanied during assessment  Assessment information provided by[de-identified] Patient  Primary Caregiver: Self  Support Systems: Spouse/significant other  [place] of Residence: [address] do you live in?: [address] access options   Select all that apply : Stairs  Number of steps to enter home :  (from the back 3 and form the front 7-8)  Type of Current Residence: 2 story home  Upon entering residence, is there a bedroom on the main floor (no further steps)?: No  A bedroom is located on the following floor levels of residence (select all that apply):: 2nd Floor  Upon entering residence, is there a bathroom on the main floor (no further steps)?: No  Indicate which floors of current residence have a bathroom (select all the apply):: 2nd Floor  Number of steps to 2nd floor from main floor: One Flight  In the last 12 months, was there a time when you were not able to pay the mortgage or rent on time?: No  In the last 12 months, was there a time when you did not have a steady place to sleep or slept in a shelter (including now)?: No  Homeless/housing insecurity resource given?: N/A  Living Arrangements: Lives w/ Spouse/significant other    Activities of Daily Living Prior to Admission  Functional Status: Independent  Completes ADLs independently?: Yes  Ambulates independently?: Yes  Does patient use assisted devices?: No  Does patient currently own DME?: No  Does patient have a history of Outpatient Therapy (PT/OT)?: No  Does the patient have a history of Short-Term Rehab?: No  Does patient have a history of HHC?: No  Does patient currently have [facility]?: No         Patient Information Continued  Does patient have prescription coverage?: Yes  Within the past 12 months, you worried that your food would run out before you got the money to buy more : Never true  Within the past 12 months, the food you bought just didnt last and you didnt have money to get more : Never true  Food insecurity resource given?: N/A  Does patient receive dialysis treatments?: No  Does patient have a history of substance abuse?: No  Does patient have a history of Mental Health Diagnosis?: No         Means of Transportation  Means of Transport to Appts[de-identified] Family transport (spouse)  In the past 12 months, has lack of transportation kept you from medical appointments or from getting medications?: No  In the past 12 months, has lack of transportation kept you from meetings, work, or from getting things needed for daily living?: No  Was application for public transport provided?: N/A        DISCHARGE DETAILS:    Discharge planning discussed with[de-identified] patient        CM contacted family/caregiver?: No- see comments (declines)  Were Treatment Team discharge recommendations reviewed with patient/caregiver?: Yes  Did patient/caregiver verbalize understanding of patient care needs?: Yes  Were patient/caregiver advised of the risks associated with not following Treatment Team discharge recommendations?: Yes       Discharge Destination Plan[de-identified] Home  Transport at Discharge : Family   Plans at this time are home on dc with OP follow up  Spouse to transport pt home on dc  CM will follow and assist in dc planning

## 2022-03-31 NOTE — ASSESSMENT & PLAN NOTE
Presented with right-sided facial droop, sensation of water dripping out of the right side of her mouth  Patient exhibits no other focal neurologic deficits  CT of the head was initially negative, with subsequent MRI of the brain negative as well - per Neurology, MRI was delayed long enough that it would have identified a new stroke  Believes symptoms to represent Bell's palsy  · Initiated on prednisone and valacyclovir - will continue for planned 1 week course  · Difficulty with closing of right eye - continue on artificial tears ointment, with instructions to tape eye shut at night and while asleep  · Plan on outpatient follow-up with Neurology

## 2022-03-31 NOTE — PROGRESS NOTES
[address] [facility] [address]  Progress Note Sherrie Nick 1950, 80 y o  female MRN: [id number]  Unit/Bed#: 401-01 Encounter: [id number]  Primary Care Provider: No primary care provider on file  Date and time admitted to hospital: 3/29/2022  8:34 PM    * Stroke-like symptoms  Assessment & Plan  Presented with right-sided facial droop, sensation of water dripping out of the right side of her mouth  Patient exhibits no other focal neurologic deficits  CT of the head was initially negative, with subsequent MRI of the brain negative as well - per Neurology, MRI was delayed long enough that it would have identified a new stroke  Believes symptoms to represent Bell's palsy  · Initiated on prednisone and valacyclovir  · Difficulty with closing of right eye as well - initiate on artificial tears ointment, with instructions to taper eye shut at night  · Plan on outpatient follow-up with Neurology  Primary hypertension  Assessment & Plan  Significantly elevated systolic blood pressures, initially was allowed to maintain permissive hypertension in case of new CVA, ruled out by MRI  · Initiate on amlodipine today and monitor BP  · Consider addition of low-dose beta-blocker if needed as well  Dyslipidemia  Assessment & Plan    Although CVA has been ruled out, will benefit from statin therapy  Continue atorvastatin  Prediabetes  Assessment & Plan  HgA1c 5 8%  Will need to focus on lifestyle modifications and dietary changes  Class 3 severe obesity due to excess calories with serious comorbidity and body mass index (BMI) of 45 0 to 49 9 in adult [facility])  Assessment & Plan  History noted  VTE Pharmacologic Prophylaxis: VTE Score: 8 Moderate Risk (Score 3-4) - Pharmacological DVT Prophylaxis Ordered: heparin  Patient Centered Rounds: I performed bedside rounds with nursing staff today    Discussions with Specialists or Other Care Team Provider: Neurology    Education and Discussions with Family / Patient: Attempted to update  () via phone  Left voicemail  Time Spent for Care: 45 minutes  More than 50% of total time spent on counseling and coordination of care as described above  Current Length of Stay: 2 day(s)  Current Patient Status: Inpatient   Certification Statement: The patient will continue to require additional inpatient hospital stay due to Monitoring of symptoms of Bell's palsy, treatment of grossly elevated blood pressure  Discharge Plan: Anticipate discharge tomorrow to home  Code Status: Level 1 - Full Code    Subjective:   Patient seen and examined - other than right-sided facial droop she has no complaints  Does endorse inability to fully close right eye upon questioning  No other focal deficits noted  Evaluated by PT/OT, and without any needs  No overnight events reported  Remains afebrile  Objective:     Vitals:   Temp (24hrs), Av 7 °F (36 5 °C), Min:97 4 °F (36 3 °C), Max:98 1 °F (36 7 °C)    Temp:  [97 4 °F (36 3 °C)-98 1 °F (36 7 °C)] 97 4 °F (36 3 °C)  HR:  [85-95] 95  Resp:  [18-22] 18  BP: (157-177)/() 157/102  SpO2:  [93 %-95 %] 94 %  Body mass index is 45 11 kg/m²  Input and Output Summary (last 24 hours): Intake/Output Summary (Last 24 hours) at 3/31/2022 1111  Last data filed at 3/31/2022 0801  Gross per 24 hour   Intake 1600 ml   Output --   Net 1600 ml       Physical Exam:   Physical Exam  Constitutional:       General: She is not in acute distress  Appearance: Normal appearance  She is normal weight  She is not ill-appearing or toxic-appearing  HENT:      Mouth/Throat:      Mouth: Mucous membranes are moist    Cardiovascular:      Rate and Rhythm: Normal rate and regular rhythm  Heart sounds: No murmur heard  No gallop  Pulmonary:      Effort: Pulmonary effort is normal  No respiratory distress  Breath sounds: Normal breath sounds  No wheezing, rhonchi or rales     Abdominal: General: Abdomen is flat  Bowel sounds are normal  There is no distension  Palpations: Abdomen is soft  Tenderness: There is no abdominal tenderness  There is no guarding or rebound  Musculoskeletal:         General: No swelling or tenderness  Skin:     General: Skin is warm and dry  Neurological:      Mental Status: She is alert and oriented to person, place, and time  Comments: Right-sided facial droop noted  Inability to close right eye fully noted as well     Psychiatric:         Mood and Affect: Mood normal          Behavior: Behavior normal        Additional Data:     Labs:  Results from last 7 days   Lab Units 03/30/22  0614   WBC Thousand/uL 10 53*   HEMOGLOBIN g/dL 15 9*   HEMATOCRIT % 47 0*   PLATELETS Thousands/uL 246   NEUTROS PCT % 60   LYMPHS PCT % 27   MONOS PCT % 10   EOS PCT % 2     Results from last 7 days   Lab Units 03/30/22  0614   SODIUM mmol/L 140   POTASSIUM mmol/L 3 8   CHLORIDE mmol/L 107   CO2 mmol/L 24   BUN mg/dL 11   CREATININE mg/dL 0 91   ANION GAP mmol/L 9   CALCIUM mg/dL 9 0   ALBUMIN g/dL 3 2*   TOTAL BILIRUBIN mg/dL 0 38   ALK PHOS U/L 123*   ALT U/L 33   AST U/L 21   GLUCOSE RANDOM mg/dL 130     Results from last 7 days   Lab Units 03/29/22  2053   INR  0 96     Results from last 7 days   Lab Units 03/29/22  2053   POC GLUCOSE mg/dl 107     Results from last 7 days   Lab Units 03/30/22  0614   HEMOGLOBIN A1C % 5 8*           Lines/Drains:  Invasive Devices  Report    Peripheral Intravenous Line            Peripheral IV 03/30/22 Right;Ventral (anterior) Forearm <1 day                      Imaging: Reviewed radiology reports from this admission including: CT head and MRI brain    Recent Cultures (last 7 days):         Last 24 Hours Medication List:   Current Facility-Administered Medications   Medication Dose Route Frequency Provider Last Rate    acetaminophen  650 mg Oral Q4H PRN Oni Saba PA-C      amLODIPine  5 mg Oral Daily Yazmin Tamayo Kamille Montano MD      artificial tear   Right Eye HS Benoit Jacobs MD      atorvastatin  40 mg Oral QPM Giovany Mathur PA-C      calcium carbonate  500 mg Oral Daily PRN Giovany Mathur PA-C      heparin (porcine)  5,000 Units Subcutaneous [facility] Benoit Jacobs MD      metoprolol succinate  25 mg Oral Daily Benoit Jacobs MD      nicotine  1 patch Transdermal Daily Giovany Mathur PA-C      ondansetron  4 mg Intravenous Q6H PRN Giovany Mathur PA-C      pantoprazole  40 mg Oral Early Morning Benoit Jacobs MD      predniSONE  80 mg Oral Daily Benoit Jacobs MD      valACYclovir  1,000 mg Oral [facility] Benoit Jacobs MD          Today, Patient Was Seen By: Benoit Jacobs MD    **Please Note: This note may have been constructed using a voice recognition system  **

## 2022-03-31 NOTE — ASSESSMENT & PLAN NOTE
Assessment/Plan:       1  Imperforate anus    I discussed the diagnosis again with Imelda's mother  I told her that Lidia Matta would require at least two more surgeries to treat her condition - a posterior sagittal anorectoplasty (PSARP) and then a colostomy reversal   Prior to that she needs an exam under anesthesia so that I can examine her perineal anatomy better to plan for the PSARP  We will also need to review the OSH records to see what was done and to review her other anomalies  The mother expressed understanding and agreement with this plan  We will contact her after I have had the chance to review the records to determine next steps and timing of the procedures  Subjective:      Patient ID: Doroteo Underwood is a 23 m o  female  Lidia Matta is a 25 mo female with a diagnosis of imperforate anus  She was initially cared for in [place] - a colostomy was performed at 2 days of life  Follow up surgery was planned for one year ago but then the family didn't follow up and moved to this area  They are here for follow up and planning of subsequent surgical procedures  The mother is unclear about whether they ever noticed any drainage from the baby's perineum  Lidia 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 and a "murmur"/"small hole in her heart"  No records are immediately available for review  These have been requested by Dr Lena Gerardo       The following portions of the patient's history were reviewed and updated as appropriate: allergies, current medications, past family history, past medical history, past social history, past surgical history and problem list     Review of Systems   Constitutional: Negative for activity change, appetite change, chills and fever  HENT: Negative for congestion, drooling, ear discharge, ear pain and sneezing  Eyes: Negative for pain, discharge and redness  Respiratory: Negative for apnea, cough, choking and wheezing      Cardiovascular: Negative for chest pain, leg swelling History noted  and cyanosis  Gastrointestinal: Negative for abdominal distention, abdominal pain, constipation, diarrhea and vomiting  Genitourinary: Negative for difficulty urinating and vaginal discharge  Musculoskeletal: Negative for joint swelling, myalgias and neck stiffness  Skin: Negative for color change, rash and wound  Allergic/Immunologic: Negative for food allergies and immunocompromised state  Neurological: Negative for seizures, weakness and headaches  Hematological: Negative for adenopathy  Does not bruise/bleed easily  Objective:      Temp 98 °F (36 7 °C) (Temporal)   Ht 31 3" (79 5 cm)   Wt 10 1 kg (22 lb 2 9 oz)   HC 47 4 cm (18 66")   BMI 15 92 kg/m²          Physical Exam   Constitutional: She appears well-developed  She is active  HENT:   Head: Atraumatic  Mouth/Throat: Mucous membranes are moist    Eyes: EOM are normal    Neck: Normal range of motion  Cardiovascular: Normal rate and regular rhythm  Pulses are strong  Pulmonary/Chest: Effort normal  No respiratory distress  She exhibits no retraction  Abdominal: Soft  She exhibits no distension  There is no tenderness  Stoma pink, (+) stool and air in bag   Genitourinary:   Genitourinary Comments: Difficult to examine perineum carefully as child was moving aorund, (+) labial fusion so could not evaluate for rectovestibular fistula, no anal opening, no perineal fistula   Musculoskeletal: Normal range of motion  Lymphadenopathy:     She has no cervical adenopathy  Neurological: She is alert  She has normal strength  Skin: Skin is warm and dry  No cyanosis

## 2022-03-31 NOTE — PLAN OF CARE
Problem: Potential for Falls  Goal: Patient will remain free of falls  Description: INTERVENTIONS:  - Educate patient/family on patient safety including physical limitations  - Instruct patient to call for assistance with activity   - Consult OT/PT to assist with strengthening/mobility   - Keep Call bell within reach  - Keep bed low and locked with side rails adjusted as appropriate  - Keep care items and personal belongings within reach  - Initiate and maintain comfort rounds  - Make Fall Risk Sign visible to staff  - Initiate/Maintain bed alarm alarm  - Apply yellow socks and bracelet for high fall risk patients  - Consider moving patient to room near nurses station  Outcome: Progressing     Problem: CARDIOVASCULAR - ADULT  Goal: Maintains optimal cardiac output and hemodynamic stability  Description: INTERVENTIONS:  - Monitor I/O, vital signs and rhythm  - Monitor for S/S and trends of decreased cardiac output  - Administer and titrate ordered vasoactive medications to optimize hemodynamic stability  - Assess quality of pulses, skin color and temperature  - Assess for signs of decreased coronary artery perfusion  - Instruct patient to report change in severity of symptoms  Outcome: Progressing  Goal: Absence of cardiac dysrhythmias or at baseline rhythm  Description: INTERVENTIONS:  - Continuous cardiac monitoring, vital signs, obtain 12 lead EKG if ordered  - Administer antiarrhythmic and heart rate control medications as ordered  - Monitor electrolytes and administer replacement therapy as ordered  Outcome: Progressing     Problem: Neurological Deficit  Goal: Neurological status is stable or improving  Description: Interventions:  - Monitor and assess patient's level of consciousness, motor function, sensory function, and level of assistance needed for ADLs  - Monitor and report changes from baseline  Collaborate with interdisciplinary team to initiate plan and implement interventions as ordered     - Provide and maintain a safe environment  - Consider seizure precautions  - Consider fall precautions  - Consider aspiration precautions  - Consider bleeding precautions  Outcome: Progressing     Problem: Potential for Aspiration  Goal: Non-ventilated patient's risk of aspiration is minimized  Description: Assess and monitor vital signs, respiratory status, and labs (WBC)  Monitor for signs of aspiration (tachypnea, cough, rales, wheezing, cyanosis, fever)  - Assess and monitor patient's ability to swallow  - Place patient up in chair to eat if possible  - HOB up at 90 degrees to eat if unable to get patient up into chair   - Supervise patient during oral intake  - Instruct patient/ family to take small bites  - Instruct patient/ family to take small single sips when taking liquids    - Follow patient-specific strategies generated by speech pathologist   Outcome: Progressing

## 2022-04-01 NOTE — UTILIZATION REVIEW
Initial Clinical Review    Admission: Date/Time/Statement:   Admission Orders (From admission, onward)     Ordered        03/29/22 2258  Inpatient Admission  Once                      Orders Placed This Encounter   Procedures    Inpatient Admission     Standing Status:   Standing     Number of Occurrences:   1     Order Specific Question:   Level of Care     Answer:   Med Surg [16]     Order Specific Question:   Estimated length of stay     Answer:   More than 2 Midnights     Order Specific Question:   Certification     Answer:   I certify that inpatient services are medically necessary for this patient for a duration of greater than two midnights  See H&P and MD Progress Notes for additional information about the patient's course of treatment  ED Arrival Information     Expected Arrival Acuity    - 3/29/2022 20:30 Emergent         Means of arrival Escorted by Service Admission type    [facility] Emergency         Arrival complaint    Possible Stroke        Chief Complaint   Patient presents with    CVA/TIA-like Symptoms     states she woke up this morning feeling normal, then shortly after started having a facial droop and slurred speech  Initial Presentation: 80 y o  female presents to ed from home for evaluation and treatment of facial droop and slurred speech  Last know well was 4 am   She states water leaked out of her mouth when drinking  Stroke alert  Clinical assessment significant for hypertension  CT/CTA without acute finding     Patient reports shortness of breath when she lies flat  Placed on 6 L O2nc  Initially treated with aspirin, iv lactated ringers, atrovastatin, sq heparin  She was outside of the time window so she was not a TPA candidate  and NHISS =1   Admit to inpatient med surg for stroke like symptoms and hypertension           Stroke alert called: 2043  Neurology response immediate  LKW: last night  NIHSS 1  for R facial droop per ED exam        CTH: No acute intracranial abnormality   Microangiopathic changes  CTA H/N: no LVO       IVtPA, Thrombectomy: not candidate due to out of window     A/P      81 yo, no PMH per ER, woke up this morning with R facial droop  NIHSS now 1  Reported LKN was last night  Impression stroke vs hypertensive emergency     Recommend admit under stroke protocol  SBP<180  MRI brain  without contrast routine  ASA now    Date: 3-30-22 Day 2: inpatient med surg   Continue evaluation of stroke like symptoms  Evaluated by neurology  Right sided facial droop persists  PT/OT evaluations completed  No rehab needs identified  MRI  And Echo completed  Monitor on telemetry and continue neuro checks  Neurology consult  3-30-22   New onset right-sided facial droop  Per records, this appears to have occurred in relative isolation  She does have risk factors for stroke (tobacco, obesity, dyslipidemia), and this should be investigated as is being done  However, cannot either discount possibility a Bell's palsy  1  MRI brain-would add contrast and attention to right 7th cranial nerve  2  Agree with antiplatelet and atorvastatin  3   Beginning this evening, should be okay to discontinue permissive hypertension, and initiate antihypertensive regimen as is appropriate         ED Triage Vitals   03/29/22 2038 03/29/22 2038 03/29/22 2038 03/29/22 2040 03/29/22 2038   (!) 97 2 °F (36 2 °C) (!) 110 20 (!) 204/122 96 %      Temporal Monitor         No Pain          03/30/22 131 kg (288 lb)     Additional Vital Signs:     Date/Time Temp Pulse Resp BP MAP (mmHg) SpO2 Nasal Cannula O2 Flow Rate (L/min) O2 Device   03/31/22 11:09:06 -- -- -- 157/102 Abnormal  120 -- -- --   03/31/22 07:08:44 97 4 °F (36 3 °C)   Abnormal  95 18 164/103 Abnormal  123 94 % -- --   03/31/22 03:41:11 97 5 °F (36 4 °C) 85 18 169/105 Abnormal  126 93 % -- --   03/30/22 22:55:01 98 1 °F (36 7 °C) 85 18 177/105 Abnormal  129 94 % -- --   03/30/22 2200 -- -- -- -- -- -- -- None (Room air)   03/30/22 1623 -- -- -- 175/91 Abnormal  -- -- -- --   03/30/22 1408 97 6 °F (36 4 °C) 90 22 159/100 125 95 % -- None (Room air)   03/30/22 1022 98 1 °F (36 7 °C) 84 20 170/106 Abnormal  132 95 % -- None (Room air)   03/30/22 0905 -- 80 -- 173/100 Abnormal  -- -- -- --   03/30/22 0842 97 6 °F (36 4 °C) 86 21 173/100 Abnormal  130 94 % -- None (Room air)   03/30/22 0715 97 8 °F (36 6 °C) 83 18 175/96 Abnormal  127 94 % -- None (Room air)   03/30/22 0600 -- 82 19 191/103 Abnormal  -- 95 % -- None (Room air)   03/30/22 04:44:39 -- 79 22 173/98 Abnormal  -- 92 % -- None (Room air)   03/30/22 0400 -- 79 20 173/102 Abnormal  -- 92 % -- None (Room air)   03/30/22 0300 -- 86 20 174/97 Abnormal  -- 94 % -- None (Room air)   03/30/22 0200 -- 79 16 169/107 Abnormal  -- 92 % -- None (Room air)   03/30/22 0100 98 2 °F (36 8 °C) 77 17 175/89 Abnormal  -- 95 % -- None (Room air)   03/30/22 0000 -- 79 18 197/93 Abnormal  -- 92 % -- None (Room air)   03/29/22 23:28:10 97 2 °F (36 2 °C)   Abnormal  79 22 151/102 Abnormal  -- 91 % -- None (Room air)   03/29/22 2300 -- 85 19 176/98 Abnormal  -- 92 % -- None (Room air)   03/29/22 2230 -- 94 22 188/90 Abnormal  -- 94 % -- None (Room air)   03/29/22 2200 -- 94 19 197/94 Abnormal  -- 92 % -- None (Room air)   03/29/22 2145 -- 91 20 184/107 Abnormal  -- 95 % -- None (Room air)    03/29/22 2140 -- 95 18 200/105 Abnormal  -- 96 % 6 L/min Nasal cannula   03/29/22 2130 -- 90 16 173/95 Abnormal  -- 95 % 6 L/min Nasal cannula   03/29/22 2125 -- 85 14 166/79 -- 96 % 6 L/min Nasal cannula   03/29/22 2120 -- 89 16 182/96 Abnormal  -- 95 % 6 L/min Nasal cannula   03/29/22 2115 -- 89 18 167/88 -- 97 % 6 L/min Nasal cannula   03/29/22 2110 -- 96 -- 160/88 -- 97 % 6 L/min Nasal cannula   03/29/22 2105 -- 99 20 168/90 -- 97 % 6 L/min Nasal cannula   03/29/22 2100 -- 90 22 162/86 -- 97 % 6 L/min Nasal cannula   03/29/22 2055 -- 88 19 182/101 Abnormal  -- 97 % -- None (Room air)   03/29/22 2040 -- 109 Abnormal  20 204/122 Abnormal  -- 95 % -- None (Room air)   03/29/22 2038 97 2 °F (36 2 °C)   Abnormal  110 Abnormal  20 -- -- 96 % -- None (Room air)     Date and Time Eye Opening Best Verbal Response Best Motor Response Afshin Coma Scale Score   03/31/22 0600 4 5 6 15   03/31/22 0200 4 5 6 15   03/30/22 2200 4 5 6 15   03/30/22 1800 4 5 6 15   03/30/22 1400 4 5 6 15   03/30/22 1000 4 5 6 15   03/30/22 0853 4 5 6 15   03/30/22 0800 4 5 6 15   03/30/22 0721 4 5 6 15   03/30/22 0700 4 5 6 15   03/30/22 0600 4 5 6 15   03/30/22 0444 4 5 6 15   03/30/22 0400 4 5 6 15   03/30/22 0300 4 5 6 15   03/30/22 0200 4 5 6 15   03/30/22 0100 4 5 6 15   03/30/22 0000 4 5 6 15   03/29/22 2328 4 5 6 15   03/29/22 2300 4 5 6 15   03/29/22 2230 4 5 6 15   03/29/22 2200 4 5 6 15   03/29/22 2140 4 5 6 15   03/29/22 2120 4 5 6 15   03/29/22 2105 4 5 6 15   03/29/22 2055 4 5 6 15   03/29/22 2040 4 5 6 15       Pertinent Labs/Diagnostic Test Results:     ECHO:     Result Date: 3/29/2022  Interpretation Summary          Left Ventricle: Left ventricular cavity size is normal  Wall thickness is increased  The left ventricular ejection fraction is 65%  Systolic function is normal  Wall motion is normal  There is mild concentric hypertrophy    Mitral Valve: There is trace regurgitation    Tricuspid Valve: There is trace regurgitation  Tue Mar 29, 2022   2114 Procedure Note: EKG  Date/Time: 03/29/22 9:14 PM     Indications / Diagnosis:  Stroke  ECG reviewed by me, the ED Provider: yes   The EKG demonstrates:  Rhythm: normal sinus  Intervals: normal intervals  Axis: normal axis  QRS/Blocks: normal QRS  ST Changes: No acute ST Changes, no STD/SANTY      No diffuse elevations to indicate pericarditis  No coved ST elevations greater than 2mm with negative T waves in V1-3 to indicate concern for brugada  No biphasic T waves in V2, V3 to indicate Wellens (critical stenosis of LAD)     No elevation in aVR or deviation when compared to V1 (can be associated with ST depression in I,II, V4-6 when left main occlusion is present)  MRI brain wo contrast   Final  (03/30 1341)      No acute intracranial abnormality  Examination degraded by patient motion  Suspected mild chronic microangiopathic change  CT stroke alert brain   Final  (03/29 2135)      No acute intracranial abnormality  Microangiopathic changes  CTA stroke alert (head/neck)   Final (03/29 2243)      No evidence of hemodynamic significant stenosis, aneurysm or dissection  1 8 cm left thyroid nodule  Incidental discovery of one or more thyroid nodule(s) measuring more than 1 5 cm and without suspicious features is noted in this patient who is above 28years old; according to guidelines published in the February 2015 white    paper on incidental thyroid nodules in the Journal of the [employer] of Radiology Wyatt Kobs), further characterization with thyroid ultrasound is recommended                 Results from last 7 days   Lab Units 03/30/22  0614 03/30/22 0052 03/29/22 2053   WBC Thousand/uL 10 53*  --  10 84*   HEMOGLOBIN g/dL 15 9*  --  16 2*   HEMATOCRIT % 47 0*  --  49 9*   PLATELETS Thousands/uL 246 241 262   NEUTROS ABS Thousands/µL 6 36  --   --          Results from last 7 days   Lab Units 03/30/22 0614 03/29/22 2053   SODIUM mmol/L 140 134*   POTASSIUM mmol/L 3 8 3 8   CHLORIDE mmol/L 107 107   CO2 mmol/L 24 24   ANION GAP mmol/L 9 3*   BUN mg/dL 11 13   CREATININE mg/dL 0 91 0 96   EGFR ml/min/1 73sq m 64 60   CALCIUM mg/dL 9 0 9 0     Results from last 7 days   Lab Units 03/30/22 0614   AST U/L 21   ALT U/L 33   ALK PHOS U/L 123*   TOTAL PROTEIN g/dL 7 4   ALBUMIN g/dL 3 2*   TOTAL BILIRUBIN mg/dL 0 38     Results from last 7 days   Lab Units 03/29/22 2053   POC GLUCOSE mg/dl 107     Results from last 7 days   Lab Units 03/30/22 0614 03/29/22 2053   GLUCOSE RANDOM mg/dL 130 115         Results from last 7 days   Lab Units 03/30/22  0614   HEMOGLOBIN A1C % 5 8*   EAG mg/dl 120       Results from last 7 days   Lab Units 03/29/22 2053   HS TNI 0HR ng/L 3         Results from last 7 days   Lab Units 03/29/22 2053   PROTIME seconds 12 4   INR  0 96   PTT seconds 46*     Results from last 7 days   Lab Units 03/30/22 0614   TSH 3RD GENERATON uIU/mL 1 368         Results from last 7 days   Lab Units 03/29/22 2053   NT-PRO BNP pg/mL 98       ED Treatment:   Medication Administration from 03/29/2022 2030 to 03/30/2022 9359       Date/Time Order Dose Route Action     03/29/2022 2153 aspirin tablet 325 mg 325 mg Oral Given     03/30/2022 0053 lactated ringers infusion 75 mL/hr Intravenous New Bag     03/30/2022 0053 atorvastatin (LIPITOR) tablet 40 mg 40 mg Oral Given     03/30/2022 0620 heparin (porcine) subcutaneous injection 5,000 Units 5,000 Units Subcutaneous Given     03/30/2022 0053 heparin (porcine) subcutaneous injection 5,000 Units 5,000 Units Subcutaneous Given        History reviewed  No pertinent past medical history    Present on Admission:  **None**      Admitting Diagnosis: Stroke-like symptoms [R29 90]  Stroke-like symptom [R29 90]  Thyroid nodule incidentally noted on imaging study [E04 1]  Age/Sex: 80 y o  female     Medication Dose Route Frequency    acetaminophen  650 mg Oral Q4H PRN    amLODIPine  5 mg Oral Daily    artificial tear   Right Eye HS    atorvastatin  40 mg Oral QPM    calcium carbonate  500 mg Oral Daily PRN    heparin (porcine)  5,000 Units Subcutaneous Q8H [address]    metoprolol succinate  25 mg Oral Daily    nicotine  1 patch Transdermal Daily    ondansetron  4 mg Intravenous Q6H PRN    pantoprazole  40 mg Oral Early Morning    predniSONE  80 mg Oral Daily    valACYclovir  1,000 mg Oral Q8H [address]         IP CONSULT TO NEUROLOGY  IP CONSULT TO CASE MANAGEMENT  IP CONSULT TO NUTRITION SERVICES    Network Utilization Review Department  ATTENTION: Please call with any questions or concerns to [phone] and carefully listen to the prompts so that you are directed to the right person  All voicemails are confidential   Mat Bicker all requests for admission clinical reviews, approved or denied determinations and any other requests to dedicated fax number below belonging to the campus where the patient is receiving treatment   List of dedicated fax numbers for the Facilities:  [address] DENIALS (Administrative/Medical Necessity) [phone]   [address] (Maternity/NICU/Pediatrics) [phone]   [address] [place] [address]  [address] [facility] [address] [address] [place] [address] Elena Khanh [address] [place] [address] [phone]

## 2022-04-03 LAB
ATRIAL RATE: 92 BPM
P AXIS: -3 DEGREES
PR INTERVAL: 184 MS
QRS AXIS: -18 DEGREES
QRSD INTERVAL: 70 MS
QT INTERVAL: 352 MS
QTC INTERVAL: 435 MS
T WAVE AXIS: 55 DEGREES
VENTRICULAR RATE: 92 BPM

## 2022-04-03 PROCEDURE — 93010 ELECTROCARDIOGRAM REPORT: CPT | Performed by: INTERNAL MEDICINE

## 2022-04-04 NOTE — UTILIZATION REVIEW
Notification of Discharge   This is a Notification of Discharge from our facility [address]  Please be advised that this patient has been discharge from our facility  Below you will find the admission and discharge date and time including the patients disposition  UTILIZATION REVIEW CONTACT:  Maria Aeljandra Benitez  Utilization   Network Utilization Review Department  Phone: [phone] x carefully listen to the prompts  All voicemails are confidential   Email: [email or web address]     PHYSICIAN ADVISORY SERVICES:  FOR HPGY-OH-LGNW REVIEW - MEDICAL NECESSITY DENIAL  Phone: [phone]  Fax: [phone]  Email: [email or web address]     PRESENTATION DATE: 3/29/2022  8:34 PM  OBERVATION ADMISSION DATE:   INPATIENT ADMISSION DATE: 3/29/22 10:58 PM   DISCHARGE DATE: 3/31/2022  1:00 PM  DISPOSITION: Home/Self Care Home/Self Care      IMPORTANT INFORMATION:  Send all requests for admission clinical reviews, approved or denied determinations and any other requests to dedicated fax number below belonging to the campus where the patient is receiving treatment   List of dedicated fax numbers:  [address] DENIALS (Administrative/Medical Necessity) [phone]   1000  [facility] (Maternity/NICU/Pediatrics) [phone]   [facility] [phone]   130 [facility] [phone]   [address] [place] [address] [phone]   2000 [facility] [address] [place] [address],[address] [phone]   [facility]  [phone]   [address] [place] [address], [address]  2401 [facility] 1000 [facility] [phone]

## 2022-04-09 DIAGNOSIS — Z23 NEED FOR SHINGLES VACCINE: Primary | ICD-10-CM

## 2022-04-09 RX ORDER — ZOSTER VACCINE RECOMBINANT, ADJUVANTED 50 MCG/0.5
0.5 KIT INTRAMUSCULAR ONCE
Qty: 1 EACH | Refills: 1 | Status: SHIPPED | OUTPATIENT
Start: 2022-04-09 | End: 2022-04-09

## 2022-04-11 ENCOUNTER — OFFICE VISIT (OUTPATIENT)
Dept: FAMILY MEDICINE CLINIC | Facility: HOME HEALTHCARE | Age: 72
End: 2022-04-11
Payer: COMMERCIAL

## 2022-04-11 VITALS
BODY MASS INDEX: 45.58 KG/M2 | HEART RATE: 97 BPM | SYSTOLIC BLOOD PRESSURE: 128 MMHG | HEIGHT: 67 IN | TEMPERATURE: 97.3 F | DIASTOLIC BLOOD PRESSURE: 90 MMHG | OXYGEN SATURATION: 98 % | RESPIRATION RATE: 20 BRPM | WEIGHT: 290.4 LBS

## 2022-04-11 DIAGNOSIS — E04.1 THYROID NODULE: ICD-10-CM

## 2022-04-11 DIAGNOSIS — R73.03 PRE-DIABETES: ICD-10-CM

## 2022-04-11 DIAGNOSIS — Z11.59 ENCOUNTER FOR HEPATITIS C SCREENING TEST FOR LOW RISK PATIENT: ICD-10-CM

## 2022-04-11 DIAGNOSIS — Z11.4 SCREENING FOR HIV WITHOUT PRESENCE OF RISK FACTORS: ICD-10-CM

## 2022-04-11 DIAGNOSIS — Z76.89 ENCOUNTER FOR SUPPORT AND COORDINATION OF TRANSITION OF CARE: Primary | ICD-10-CM

## 2022-04-11 DIAGNOSIS — R29.90 STROKE-LIKE SYMPTOMS: ICD-10-CM

## 2022-04-11 DIAGNOSIS — Z12.31 ENCOUNTER FOR SCREENING MAMMOGRAM FOR MALIGNANT NEOPLASM OF BREAST: ICD-10-CM

## 2022-04-11 DIAGNOSIS — I10 PRIMARY HYPERTENSION: ICD-10-CM

## 2022-04-11 DIAGNOSIS — G51.0 BELL'S PALSY: ICD-10-CM

## 2022-04-11 DIAGNOSIS — Z12.11 COLON CANCER SCREENING: ICD-10-CM

## 2022-04-11 PROCEDURE — 99495 TRANSJ CARE MGMT MOD F2F 14D: CPT | Performed by: FAMILY MEDICINE

## 2022-04-11 NOTE — PROGRESS NOTES
[address]       NAME: Marychuy Vitale is a 80 y o  female  : 1950    MRN: [id number]  DATE: 2022  TIME: 12:07 PM    Assessment and Plan   Diagnoses and all orders for this visit:    Encounter for support and coordination of transition of care    Stroke-like symptoms  Comments:   probable [place] palsy  CT and MRI unremarkable    Colon cancer screening  -     Ambulatory Referral to Gastroenterology; Future    Screening for HIV without presence of risk factors    Encounter for hepatitis C screening test for low risk patient  -     Hepatitis C antibody; Future    Encounter for screening mammogram for malignant neoplasm of breast  -     Mammo screening bilateral w 3d & cad; Future    Thyroid nodule  -     TSH, 3rd generation with Free T4 reflex; Future    Pre-diabetes  -     Comprehensive metabolic panel  -     HEMOGLOBIN A1C W/ EAG ESTIMATION; Future    Primary hypertension  -     Comprehensive metabolic panel  -     Lipid Panel with Direct LDL reflex; Future  -     CBC and differential  -     TSH, 3rd generation with Free T4 reflex; Future        Outpatient neurology appointment pending  Patient schedule follow up to see me in 2-3 months or sooner if needed  Instructed to call me with any questions or concerns  Lab work pending will call patient with results    Chief Complaint     Chief Complaint   Patient presents with    Transition of Care Management     Bell's palsy         History of Present Illness       HPI   61-year-old female presents today for transition of medical care after recent admission  Patient was admitted from  till 2022  Patient was admitted with stroke-like symptoms thought to possibly have Bell's palsy  Referral to neurology was placed  CT of head was initially negative with subsequent MRI of the brain negative as well  As per Neurology MRI was too late long enough that it would have identified a new stroke    They also believed possible Bell's palsy  Patient was initiated on prednisone and valacyclovir  And completing 1 week course  Patient still having some difficulties with closing her right eye completely and continuing artificial tear ointment and instructed to take by [place] at night while asleep  Outpatient neurology follow-up scheduled  Patient did have significantly elevated systolic blood pressure which has now resolved  Dyslipidemia on atorvastatin  Hypertension on Norvasc  And metoprolol  Incidental thyroid nodule found on CT measuring 1 8 cm left thyroid nodule  Ultrasound of thyroid pending    Patient states feeling better however still having issues with right-sided facial weakness and unable to completely close her eye    Review of Systems   Review of Systems    right facial droop   denies fevers, chills, headaches, dizziness, shortness of breath, chest pain, nausea, vomiting, diarrhea  All other systems negative    Current Medications       Current Outpatient Medications:     amLODIPine (NORVASC) 5 mg tablet, Take 1 tablet (5 mg total) by mouth daily, Disp: 30 tablet, Rfl: 0    artificial tear (LUBRIFRESH P M ) 83-15 % ophthalmic ointment, Administer to the right eye daily at bedtime, Disp: 3 5 g, Rfl: 0    atorvastatin (LIPITOR) 40 mg tablet, Take 1 tablet (40 mg total) by mouth every evening, Disp: 30 tablet, Rfl: 0    metoprolol succinate (TOPROL-XL) 25 mg 24 hr tablet, Take 1 tablet (25 mg total) by mouth daily, Disp: 30 tablet, Rfl: 0    valACYclovir (VALTREX) 1,000 mg tablet, Take 1 tablet (1,000 mg total) by mouth every 8 (eight) hours for 7 days, Disp: 21 tablet, Rfl: 0    Current Allergies     Allergies as of 04/11/2022    (No Known Allergies)            The following portions of the patient's history were reviewed and updated as appropriate: allergies, current medications, past family history, past medical history, past social history, past surgical history and problem list      Past Medical History:   Diagnosis Date    Hypercholesterolemia     Hypertension        Past Surgical History:   Procedure Laterality Date    GALLBLADDER SURGERY  2008       Family History   Problem Relation Age of Onset    Cervical cancer Mother     Heart disease Father          Medications have been verified  Objective   /90   Pulse 97   Temp (!) 97 3 °F (36 3 °C)   Resp 20   Ht 5' 7" (1 702 m)   Wt 132 kg (290 lb 6 4 oz)   SpO2 98%   BMI 45 48 kg/m²        Physical Exam     Physical Exam  Vitals and nursing note reviewed  Constitutional:       General: She is not in acute distress  Appearance: She is not ill-appearing, toxic-appearing or diaphoretic  HENT:      Mouth/Throat:      Mouth: Mucous membranes are moist    Eyes:      General: No scleral icterus  Conjunctiva/sclera: Conjunctivae normal    Cardiovascular:      Rate and Rhythm: Normal rate and regular rhythm  Heart sounds: Normal heart sounds  Pulmonary:      Effort: Pulmonary effort is normal  No respiratory distress  Breath sounds: Normal breath sounds  No wheezing or rales  Abdominal:      General: Bowel sounds are normal       Palpations: Abdomen is soft  Tenderness: There is no abdominal tenderness  Musculoskeletal:      Cervical back: Neck supple  Lymphadenopathy:      Cervical: No cervical adenopathy  Neurological:      Mental Status: She is alert and oriented to person, place, and time        Comments: Right facial droop   Psychiatric:         Mood and Affect: Mood normal

## 2022-05-16 ENCOUNTER — HOSPITAL ENCOUNTER (OUTPATIENT)
Dept: ULTRASOUND IMAGING | Facility: HOSPITAL | Age: 72
Discharge: HOME | End: 2022-05-16
Attending: INTERNAL MEDICINE
Payer: COMMERCIAL

## 2022-05-16 DIAGNOSIS — E04.1 THYROID NODULE: ICD-10-CM

## 2022-05-16 PROCEDURE — 76536 US EXAM OF HEAD AND NECK: CPT

## 2022-06-01 ENCOUNTER — TELEPHONE (OUTPATIENT)
Dept: GASTROENTEROLOGY | Facility: CLINIC | Age: 72
End: 2022-06-01

## 2022-06-01 NOTE — TELEPHONE ENCOUNTER
Left message for patient to contact our office to reschedule appointment due to providers schedule change

## 2022-07-21 ENCOUNTER — HOSPITAL ENCOUNTER (EMERGENCY)
Facility: HOSPITAL | Age: 72
Discharge: HOME | End: 2022-07-21
Attending: EMERGENCY MEDICINE | Admitting: EMERGENCY MEDICINE
Payer: COMMERCIAL

## 2022-07-21 ENCOUNTER — APPOINTMENT (EMERGENCY)
Dept: NON INVASIVE DIAGNOSTICS | Facility: HOSPITAL | Age: 72
End: 2022-07-21
Payer: COMMERCIAL

## 2022-07-21 VITALS
DIASTOLIC BLOOD PRESSURE: 113 MMHG | TEMPERATURE: 96.8 F | RESPIRATION RATE: 20 BRPM | OXYGEN SATURATION: 94 % | SYSTOLIC BLOOD PRESSURE: 182 MMHG | HEART RATE: 92 BPM

## 2022-07-21 DIAGNOSIS — R60.0 LOWER EXTREMITY EDEMA: Primary | ICD-10-CM

## 2022-07-21 DIAGNOSIS — M79.89 LEFT LEG SWELLING: ICD-10-CM

## 2022-07-21 LAB
ALBUMIN SERPL BCP-MCNC: 3.2 G/DL (ref 3.5–5)
ALP SERPL-CCNC: 144 U/L (ref 46–116)
ALT SERPL W P-5'-P-CCNC: 52 U/L (ref 12–78)
ANION GAP SERPL CALCULATED.3IONS-SCNC: 11 MMOL/L (ref 4–13)
AST SERPL W P-5'-P-CCNC: 47 U/L (ref 5–45)
BASOPHILS # BLD AUTO: 0.04 THOUSANDS/ΜL (ref 0–0.1)
BASOPHILS NFR BLD AUTO: 0 % (ref 0–1)
BILIRUB SERPL-MCNC: 0.52 MG/DL (ref 0.2–1)
BUN SERPL-MCNC: 14 MG/DL (ref 5–25)
CALCIUM ALBUM COR SERPL-MCNC: 9.9 MG/DL (ref 8.3–10.1)
CALCIUM SERPL-MCNC: 9.3 MG/DL (ref 8.3–10.1)
CHLORIDE SERPL-SCNC: 104 MMOL/L (ref 96–108)
CO2 SERPL-SCNC: 26 MMOL/L (ref 21–32)
CREAT SERPL-MCNC: 0.94 MG/DL (ref 0.6–1.3)
EOSINOPHIL # BLD AUTO: 0.19 THOUSAND/ΜL (ref 0–0.61)
EOSINOPHIL NFR BLD AUTO: 2 % (ref 0–6)
ERYTHROCYTE [DISTWIDTH] IN BLOOD BY AUTOMATED COUNT: 13 % (ref 11.6–15.1)
GFR SERPL CREATININE-BSD FRML MDRD: 61 ML/MIN/1.73SQ M
GLUCOSE SERPL-MCNC: 100 MG/DL (ref 65–140)
HCT VFR BLD AUTO: 47.8 % (ref 34.8–46.1)
HGB BLD-MCNC: 15.3 G/DL (ref 11.5–15.4)
IMM GRANULOCYTES # BLD AUTO: 0.05 THOUSAND/UL (ref 0–0.2)
IMM GRANULOCYTES NFR BLD AUTO: 1 % (ref 0–2)
LYMPHOCYTES # BLD AUTO: 2.6 THOUSANDS/ΜL (ref 0.6–4.47)
LYMPHOCYTES NFR BLD AUTO: 29 % (ref 14–44)
MCH RBC QN AUTO: 31.2 PG (ref 26.8–34.3)
MCHC RBC AUTO-ENTMCNC: 32 G/DL (ref 31.4–37.4)
MCV RBC AUTO: 98 FL (ref 82–98)
MONOCYTES # BLD AUTO: 1.06 THOUSAND/ΜL (ref 0.17–1.22)
MONOCYTES NFR BLD AUTO: 12 % (ref 4–12)
NEUTROPHILS # BLD AUTO: 4.95 THOUSANDS/ΜL (ref 1.85–7.62)
NEUTS SEG NFR BLD AUTO: 56 % (ref 43–75)
NRBC BLD AUTO-RTO: 0 /100 WBCS
PLATELET # BLD AUTO: 227 THOUSANDS/UL (ref 149–390)
PMV BLD AUTO: 10.3 FL (ref 8.9–12.7)
POTASSIUM SERPL-SCNC: 4 MMOL/L (ref 3.5–5.3)
PROT SERPL-MCNC: 7.1 G/DL (ref 6.4–8.4)
RBC # BLD AUTO: 4.9 MILLION/UL (ref 3.81–5.12)
SODIUM SERPL-SCNC: 141 MMOL/L (ref 135–147)
WBC # BLD AUTO: 8.89 THOUSAND/UL (ref 4.31–10.16)

## 2022-07-21 PROCEDURE — 99284 EMERGENCY DEPT VISIT MOD MDM: CPT

## 2022-07-21 PROCEDURE — 93005 ELECTROCARDIOGRAM TRACING: CPT

## 2022-07-21 PROCEDURE — 36415 COLL VENOUS BLD VENIPUNCTURE: CPT | Performed by: EMERGENCY MEDICINE

## 2022-07-21 PROCEDURE — 99285 EMERGENCY DEPT VISIT HI MDM: CPT | Performed by: EMERGENCY MEDICINE

## 2022-07-21 PROCEDURE — 80053 COMPREHEN METABOLIC PANEL: CPT | Performed by: EMERGENCY MEDICINE

## 2022-07-21 PROCEDURE — 93971 EXTREMITY STUDY: CPT | Performed by: SURGERY

## 2022-07-21 PROCEDURE — 93971 EXTREMITY STUDY: CPT

## 2022-07-21 PROCEDURE — 85025 COMPLETE CBC W/AUTO DIFF WBC: CPT | Performed by: EMERGENCY MEDICINE

## 2022-07-21 NOTE — ED PROCEDURE NOTE
PROCEDURE  ECG 12 Lead Documentation Only    Date/Time: 7/21/2022 5:31 PM  Performed by: Ayaan Smith MD  Authorized by: Ayaan Smith MD     Indications / Diagnosis:  Leg swelling  ECG reviewed by me, the ED Provider: yes    Patient location:  ED  Previous ECG:     Previous ECG:  Compared to current    Similarity:  No change  Interpretation:     Interpretation: abnormal    Rate:     ECG rate assessment: normal    Rhythm:     Rhythm: sinus rhythm    Ectopy:     Ectopy: none    QRS:     QRS axis:  Normal    QRS intervals:  Normal  Conduction:     Conduction: normal    ST segments:     ST segments:  Abnormal    Depression:  II  T waves:     T waves: normal    Q waves:     Q waves:  V1         Ayaan Smith MD  07/21/22 1732

## 2022-07-21 NOTE — ED PROVIDER NOTES
History  Chief Complaint   Patient presents with    Leg Swelling     Pt states she started yesterday with left leg swelling  Pt denies pain     80-year-old female with a past medical history of active smoking, hypertension, hyper cholesterolemia, noncompliant with medications and currently takes no medications, who presents for left leg swelling  She describes the swelling began yesterday  It is not painful, but it does include the entire leg  She said this has never happened in the past   She denies any leg pain  Denies any chest pain, no shortness of breath, no shortness of breath or chest pain with exertion  No pleuritic chest pain  No nausea or vomiting, no abdominal pain, no new back pain, no urinary changes  No redness to the leg  No hx of DVT/PE, no recent surgeries, no recent long distance travel, no estrogen-containing products  Patient is not on [facility]  ROS otherwise neg  Prior to Admission Medications   Prescriptions Last Dose Informant Patient Reported? Taking?    amLODIPine (NORVASC) 5 mg tablet   No No   Sig: Take 1 tablet (5 mg total) by mouth daily   artificial tear (LUBRIFRESH P M ) 83-15 % ophthalmic ointment   No No   Sig: Administer to the right eye daily at bedtime   atorvastatin (LIPITOR) 40 mg tablet   No No   Sig: Take 1 tablet (40 mg total) by mouth every evening   metoprolol succinate (TOPROL-XL) 25 mg 24 hr tablet   No No   Sig: Take 1 tablet (25 mg total) by mouth daily   valACYclovir (VALTREX) 1,000 mg tablet   No No   Sig: Take 1 tablet (1,000 mg total) by mouth every 8 (eight) hours for 7 days      Facility-Administered Medications: None       Past Medical History:   Diagnosis Date    Hypercholesterolemia     Hypertension        Past Surgical History:   Procedure Laterality Date    GALLBLADDER SURGERY  2008       Family History   Problem Relation Age of Onset    Cervical cancer Mother     Heart disease Father      I have reviewed and agree with the history as documented  E-Cigarette/Vaping    E-Cigarette Use Never User      E-Cigarette/Vaping Substances     Social History     Tobacco Use    Smoking status: Current Every Day Smoker     Packs/day: 1 00     Types: Cigarettes    Smokeless tobacco: Never Used   Vaping Use    Vaping Use: Never used   Substance Use Topics    Alcohol use: Not Currently     Comment: socially    Drug use: No       Review of Systems   Constitutional: Negative for chills and fever  HENT: Negative for congestion, rhinorrhea and sore throat  Respiratory: Negative for cough and shortness of breath  Cardiovascular: Positive for leg swelling  Negative for chest pain and palpitations  Gastrointestinal: Negative for abdominal pain, constipation, diarrhea, nausea and vomiting  Genitourinary: Negative for difficulty urinating and flank pain  Musculoskeletal: Negative for arthralgias  Neurological: Negative for dizziness, weakness, light-headedness and headaches  Psychiatric/Behavioral: Negative for agitation, behavioral problems and confusion  All other systems reviewed and are negative  Physical Exam  Physical Exam  Constitutional:       Appearance: She is well-developed  HENT:      Head: Normocephalic and atraumatic  Right Ear: Tympanic membrane normal       Left Ear: Tympanic membrane normal    Cardiovascular:      Rate and Rhythm: Normal rate and regular rhythm  Heart sounds: Normal heart sounds  No murmur heard  No friction rub  Pulmonary:      Effort: Pulmonary effort is normal  No respiratory distress  Breath sounds: Normal breath sounds  No wheezing or rales  Abdominal:      General: Bowel sounds are normal  There is no distension  Palpations: Abdomen is soft  Tenderness: There is no abdominal tenderness  Musculoskeletal:         General: Swelling present  No tenderness  Normal range of motion  Cervical back: Normal range of motion and neck supple        Left lower leg: Edema present  Comments: The left leg is edematous and swollen compared to the right  There is no tenderness throughout the leg, patient actually laughs when palpated saying that it is ticklish  It does appear to be throughout the entire leg when compared to the right  No discoloration of the leg  Pitting edema is noted but mild  Patient has 5/5 strength throughout the left hip, knee, and ankle  No difficulty or pain ranging them  Skin:     General: Skin is warm  Neurological:      Mental Status: She is alert and oriented to person, place, and time  Coordination: Coordination normal    Psychiatric:         Behavior: Behavior normal          Thought Content:  Thought content normal          Judgment: Judgment normal          Vital Signs  ED Triage Vitals   Temperature Pulse Respirations Blood Pressure SpO2   07/21/22 1515 07/21/22 1515 07/21/22 1515 07/21/22 1515 07/21/22 1515   (!) 96 8 °F (36 °C) 93 18 (!) 179/113 94 %      Temp Source Heart Rate Source Patient Position - Orthostatic VS BP Location FiO2 (%)   07/21/22 1515 07/21/22 1515 07/21/22 1515 07/21/22 1515 --   Temporal Monitor Lying Left arm       Pain Score       07/21/22 1513       No Pain           Vitals:    07/21/22 1515 07/21/22 1530   BP: (!) 179/113 (!) 182/113   Pulse: 93 92   Patient Position - Orthostatic VS: Lying Lying         Visual Acuity      ED Medications  Medications - No data to display    Diagnostic Studies  Results Reviewed     Procedure Component Value Units Date/Time    Comprehensive metabolic panel [[id number]]  (Abnormal) Collected: 07/21/22 1559    Lab Status: Final result Specimen: Blood from Arm, Left Updated: 07/21/22 1633     Sodium 141 mmol/L      Potassium 4 0 mmol/L      Chloride 104 mmol/L      CO2 26 mmol/L      ANION GAP 11 mmol/L      BUN 14 mg/dL      Creatinine 0 94 mg/dL      Glucose 100 mg/dL      Calcium 9 3 mg/dL      Corrected Calcium 9 9 mg/dL      AST 47 U/L      ALT 52 U/L      Alkaline Phosphatase 144 U/L      Total Protein 7 1 g/dL      Albumin 3 2 g/dL      Total Bilirubin 0 52 mg/dL      eGFR 61 ml/min/1 73sq m     Narrative:      National Kidney Disease Foundation guidelines for Chronic Kidney Disease (CKD):     Stage 1 with normal or high GFR (GFR > 90 mL/min/1 73 square meters)    Stage 2 Mild CKD (GFR = 60-89 mL/min/1 73 square meters)    Stage 3A Moderate CKD (GFR = 45-59 mL/min/1 73 square meters)    Stage 3B Moderate CKD (GFR = 30-44 mL/min/1 73 square meters)    Stage 4 Severe CKD (GFR = 15-29 mL/min/1 73 square meters)    Stage 5 End Stage CKD (GFR <15 mL/min/1 73 square meters)  Note: GFR calculation is accurate only with a steady state creatinine    CBC and differential [[id number]]  (Abnormal) Collected: 07/21/22 6944    Lab Status: Final result Specimen: Blood from Arm, Left Updated: 07/21/22 1608     WBC 8 89 Thousand/uL      RBC 4 90 Million/uL      Hemoglobin 15 3 g/dL      Hematocrit 47 8 %      MCV 98 fL      MCH 31 2 pg      MCHC 32 0 g/dL      RDW 13 0 %      MPV 10 3 fL      Platelets 804 Thousands/uL      nRBC 0 /100 WBCs      Neutrophils Relative 56 %      Immat GRANS % 1 %      Lymphocytes Relative 29 %      Monocytes Relative 12 %      Eosinophils Relative 2 %      Basophils Relative 0 %      Neutrophils Absolute 4 95 Thousands/µL      Immature Grans Absolute 0 05 Thousand/uL      Lymphocytes Absolute 2 60 Thousands/µL      Monocytes Absolute 1 06 Thousand/µL      Eosinophils Absolute 0 19 Thousand/µL      Basophils Absolute 0 04 Thousands/µL                  VAS lower limb venous duplex study, unilateral/limited    (Results Pending)              Procedures  Procedures         ED Course  ED Course as of 07/21/22 1702   Thu Jul 21, 2022   1653 Duplex negative                                       Stephanie Johnson' Criteria for DVT    Flowsheet Row Most Recent Value   Keshawn' Criteria for DVT    Active cancer Treatment or palliation within 6 months 0 Filed at: 07/21/2022 9622 Bedridden recently >3 days or major surgery within 12 weeks 0 Filed at: 07/21/2022 1530   Calf swelling >3 cm compared to the other leg 1 Filed at: 07/21/2022 1530   Entire leg swollen 1 Filed at: 07/21/2022 1530   Collateral (nonvaricose) superficial veins present 0 Filed at: 07/21/2022 1530   Localized tenderness along the deep venous system 0 Filed at: 07/21/2022 1530   Pitting edema, confined to symptomatic leg 1 Filed at: 07/21/2022 1530   Paralysis, paresis, or recent plaster immobilization of the lower extremity 0 Filed at: 07/21/2022 1530   Previously documented DVT 0 Filed at: 07/21/2022 1530   Alternative diagnosis to DVT as likely or more likely 0 Filed at: 07/21/2022 1530   Wells DVT Critera Score 3 Filed at: 07/21/2022 1530              [facility]  Number of Diagnoses or Management Options  Left leg swelling  Lower extremity edema  Diagnosis management comments: Patient's presentation is of uncertain etiology  Patient not describe any other symptoms other than this  There is concerned this may be due to DVT  Will order duplex, basic labs  Will have patient follow up with PCP if negative given lack of other acute findings suggestive of alternative pathology at this time  Duplex negative  Given uncertain etiology, recommended close follow up with her PCP as they may recommend further labs or imaging  Patient states she will call doctor in the morning  Patient excited to go home, states her care was "excellent"  Provided strict return precautions prior to discharge, she understands         Disposition  Final diagnoses:   Lower extremity edema   Left leg swelling     Time reflects when diagnosis was documented in both MDM as applicable and the Disposition within this note     Time User Action Codes Description Comment    7/21/2022  4:48 PM Mohsen Lei [P99 73] Cellulitis     7/21/2022  4:48 PM Chandni Rayray Add [R60 0] Bilateral leg edema     7/21/2022  4:49 PM Chandni Rayray Add [N17 9] JANY (acute kidney injury) ([address] )     7/21/2022  4:49 PM Amauri Lions Add [E87 6] Hypokalemia     7/21/2022  4:49 PM Amauri Lions Add [R30 0] Dysuria     7/21/2022  4:50 PM Amauri Lions Modify [R60 0] Bilateral leg edema     7/21/2022  4:50 PM Amauri Lions Remove [L03 90] Cellulitis     7/21/2022  4:50 PM Amauri Lions Modify [N17 9] JANY (acute kidney injury) ([address] )     7/21/2022  4:50 PM Amauri Lions Remove [R60 0] Bilateral leg edema     7/21/2022  4:50 PM Amauri Lions Modify [E87 6] Hypokalemia     7/21/2022  4:50 PM Zahra, Julita Gulling Remove [N17 9] JANY (acute kidney injury) ([address] )     7/21/2022  4:50 PM Amauri Lions Modify [R30 0] Dysuria     7/21/2022  4:50 PM Zahra, Julita Gulling Remove [E87 6] Hypokalemia     7/21/2022  4:50 PM Amauri Lions Remove [R30 0] Dysuria     7/21/2022  4:50 PM Amauri Lions Add [R60 0] Lower extremity edema     7/21/2022  4:50 PM Amauri Lions Add [M79 89] Left leg swelling       ED Disposition     ED Disposition   Discharge    Condition   Stable    Date/Time   Thu Jul 21, 2022  4:50 PM    Comment              Follow-up Information     Follow up With Specialties Details Why Contact Info    Amanda Monsalve PA-C Physician Assistant, Family Medicine Call  [facility]  [phone]            Patient's Medications   Discharge Prescriptions    No medications on file       No discharge procedures on file      PDMP Review     None          ED Provider  Electronically Signed by           Ubaldo Tesfaye MD  07/21/22 Yash Vera MD  07/21/22 6985

## 2022-07-21 NOTE — DISCHARGE INSTRUCTIONS
Please discuss your ED stay today with your primary care doctor, as they may want to pursue further imaging, laboratory studies  Your US for blood clot in the leg was negative  Continue to evaluate for fevers, abdominal or back pain, or redness of the leg  If pain becomes severe, please return also  Follow all return precautions  Thank you

## 2022-07-21 NOTE — Clinical Note
Case was discussed with GIDEON and the patient's admission status was agreed to be Admission Status: inpatient status to the service of Dr Carmelita Veras

## 2022-07-22 LAB
ATRIAL RATE: 82 BPM
P AXIS: 23 DEGREES
PR INTERVAL: 178 MS
QRS AXIS: -1 DEGREES
QRSD INTERVAL: 88 MS
QT INTERVAL: 402 MS
QTC INTERVAL: 469 MS
T WAVE AXIS: 60 DEGREES
VENTRICULAR RATE: 82 BPM

## 2022-07-22 PROCEDURE — 93010 ELECTROCARDIOGRAM REPORT: CPT | Performed by: INTERNAL MEDICINE

## 2023-02-06 ENCOUNTER — OFFICE VISIT (OUTPATIENT)
Dept: FAMILY MEDICINE CLINIC | Facility: HOME HEALTHCARE | Age: 73
End: 2023-02-06

## 2023-02-06 DIAGNOSIS — R22.1 MULTIPLE MASSES OF NECK: ICD-10-CM

## 2023-02-06 DIAGNOSIS — E78.5 DYSLIPIDEMIA: ICD-10-CM

## 2023-02-06 DIAGNOSIS — Z72.0 TOBACCO USE: ICD-10-CM

## 2023-02-06 DIAGNOSIS — R63.4 WEIGHT LOSS: ICD-10-CM

## 2023-02-06 DIAGNOSIS — I10 PRIMARY HYPERTENSION: Primary | ICD-10-CM

## 2023-02-06 DIAGNOSIS — E04.1 THYROID NODULE: ICD-10-CM

## 2023-02-06 RX ORDER — METOPROLOL SUCCINATE 25 MG/1
25 TABLET, EXTENDED RELEASE ORAL DAILY
Qty: 90 TABLET | Refills: 0 | Status: SHIPPED | OUTPATIENT
Start: 2023-02-06 | End: 2023-02-06 | Stop reason: SDUPTHER

## 2023-02-06 RX ORDER — ATORVASTATIN CALCIUM 40 MG/1
40 TABLET, FILM COATED ORAL EVERY EVENING
Qty: 90 TABLET | Refills: 0 | Status: SHIPPED | OUTPATIENT
Start: 2023-02-06

## 2023-02-06 RX ORDER — AMLODIPINE BESYLATE 5 MG/1
5 TABLET ORAL DAILY
Qty: 90 TABLET | Refills: 0 | Status: SHIPPED | OUTPATIENT
Start: 2023-02-06

## 2023-02-06 RX ORDER — ATORVASTATIN CALCIUM 40 MG/1
40 TABLET, FILM COATED ORAL EVERY EVENING
Qty: 90 TABLET | Refills: 0 | Status: SHIPPED | OUTPATIENT
Start: 2023-02-06 | End: 2023-02-06 | Stop reason: SDUPTHER

## 2023-02-06 RX ORDER — AMLODIPINE BESYLATE 5 MG/1
5 TABLET ORAL DAILY
Qty: 90 TABLET | Refills: 0 | Status: SHIPPED | OUTPATIENT
Start: 2023-02-06 | End: 2023-02-06 | Stop reason: SDUPTHER

## 2023-02-06 RX ORDER — METOPROLOL SUCCINATE 25 MG/1
25 TABLET, EXTENDED RELEASE ORAL DAILY
Qty: 90 TABLET | Refills: 0 | Status: SHIPPED | OUTPATIENT
Start: 2023-02-06

## 2023-02-06 NOTE — PROGRESS NOTES
Assessment/Plan:    No problem-specific Assessment & Plan notes found for this encounter  Diagnoses and all orders for this visit:    Primary hypertension  Comments:  restarted meds  Orders:  -     CBC and differential; Future  -     Comprehensive metabolic panel; Future  -     TSH, 3rd generation; Future  -     Discontinue: amLODIPine (NORVASC) 5 mg tablet; Take 1 tablet (5 mg total) by mouth daily  -     Discontinue: metoprolol succinate (TOPROL-XL) 25 mg 24 hr tablet; Take 1 tablet (25 mg total) by mouth daily  -     amLODIPine (NORVASC) 5 mg tablet; Take 1 tablet (5 mg total) by mouth daily  -     metoprolol succinate (TOPROL-XL) 25 mg 24 hr tablet; Take 1 tablet (25 mg total) by mouth daily    Thyroid nodule  Comments:  needs thyroid US soon and likely biopsy  Orders:  -     US thyroid; Future    Dyslipidemia  Comments:  restarted meds  Orders:  -     Discontinue: atorvastatin (LIPITOR) 40 mg tablet; Take 1 tablet (40 mg total) by mouth every evening  -     atorvastatin (LIPITOR) 40 mg tablet; Take 1 tablet (40 mg total) by mouth every evening    Multiple masses of neck  Comments:  US head and neck  Orders:  -     US head neck soft tissue; Future    Weight loss  Comments:  check labs; screen thyroid and may need other cancer screens    Body mass index (BMI) 45 0-49 9, adult (HCC)    Tobacco use  Comments:  counselled on quitting; wants to try on her own          Subjective:      Patient ID: Niko Moore is a 71 y o  female  HPI Patient presents for 2 lumps on back of neck for many months  No pain or discharge but more noticeable  Also had thyroid US in hospital 5/22 (for Bell's palsy) and states she did not realize she needed for f/up for nodules, including biopsy  She smokes 0 75 ppd for many years  States she noticed LLE larger for about a year and had venous duplex in hospital 7/22 which did not reveal DVT  She denies pain in this leg  Otherwise, she states she feels well and eats well   She stopped taking medicines around October (ran out)  The following portions of the patient's history were reviewed and updated as appropriate: allergies, current medications, past family history, past medical history, past social history, past surgical history and problem list     Review of Systems   Constitutional: Negative for activity change, appetite change, chills and fever  HENT: Negative for congestion, sore throat and trouble swallowing  Respiratory: Negative for shortness of breath  Cardiovascular: Positive for leg swelling  Negative for chest pain and palpitations  Gastrointestinal: Negative for abdominal distention, abdominal pain, blood in stool, constipation, diarrhea, nausea and vomiting  Genitourinary: Negative for difficulty urinating and hematuria  Neurological: Negative for headaches  Psychiatric/Behavioral: Negative for sleep disturbance  Objective: There were no vitals taken for this visit  Physical Exam  Vitals (weight 262 lbs  ) reviewed  Constitutional:       General: She is not in acute distress  Appearance: Normal appearance  HENT:      Head: Normocephalic and atraumatic  Mouth/Throat:      Mouth: Mucous membranes are moist       Comments: Missing teeth  Eyes:      General: No scleral icterus  Conjunctiva/sclera: Conjunctivae normal    Neck:      Comments: 2 small cysts L occiptal area and posterior nack; well circumscribed 1-1 5 cm and mobile    Mild thyromegaly  Cardiovascular:      Rate and Rhythm: Normal rate and regular rhythm  Pulses: Normal pulses  Heart sounds: Normal heart sounds  No murmur heard  No gallop  Comments: Pulse ox 96%  HR 93 /88  Pulmonary:      Effort: Pulmonary effort is normal       Breath sounds: Normal breath sounds  Comments: RS09  Musculoskeletal:      Cervical back: Normal range of motion and neck supple        Comments: LLE larger than RLE but no pain, cord or Gildardo sign   Skin: General: Skin is warm and dry  Neurological:      General: No focal deficit present  Mental Status: She is alert and oriented to person, place, and time  Cranial Nerves: No cranial nerve deficit     Psychiatric:         Mood and Affect: Mood normal          Behavior: Behavior normal

## 2023-02-08 ENCOUNTER — HOSPITAL ENCOUNTER (OUTPATIENT)
Dept: ULTRASOUND IMAGING | Facility: HOSPITAL | Age: 73
Discharge: HOME | End: 2023-02-08

## 2023-02-08 DIAGNOSIS — R22.1 MULTIPLE MASSES OF NECK: ICD-10-CM

## 2023-02-08 DIAGNOSIS — E04.1 THYROID NODULE: ICD-10-CM

## 2023-02-13 ENCOUNTER — RA CDI HCC (OUTPATIENT)
Dept: OTHER | Facility: HOSPITAL | Age: 73
End: 2023-02-13

## 2023-02-13 NOTE — PROGRESS NOTES
[facility]  coding opportunities          Chart Reviewed number of suggestions sent to Provider: 1     Patients Insurance     Medicare Insurance: [employer]        [id number]

## 2023-02-24 DIAGNOSIS — E04.1 THYROID NODULE: Primary | ICD-10-CM

## 2023-02-24 DIAGNOSIS — R22.1 NECK MASS: ICD-10-CM

## 2023-03-09 ENCOUNTER — OFFICE VISIT (OUTPATIENT)
Dept: FAMILY MEDICINE CLINIC | Facility: HOME HEALTHCARE | Age: 73
End: 2023-03-09

## 2023-03-09 VITALS
OXYGEN SATURATION: 98 % | HEART RATE: 65 BPM | DIASTOLIC BLOOD PRESSURE: 80 MMHG | SYSTOLIC BLOOD PRESSURE: 124 MMHG | WEIGHT: 258.6 LBS | RESPIRATION RATE: 16 BRPM | HEIGHT: 67 IN | BODY MASS INDEX: 40.59 KG/M2 | TEMPERATURE: 97.1 F

## 2023-03-09 DIAGNOSIS — E04.2 MULTIPLE THYROID NODULES: Primary | ICD-10-CM

## 2023-03-09 DIAGNOSIS — R22.1 NECK MASS: ICD-10-CM

## 2023-03-09 NOTE — PROGRESS NOTES
Assessment/Plan:     Diagnoses and all orders for this visit:    Multiple thyroid nodules    Neck mass  -     Ambulatory Referral to General Surgery; Future      - Thyroid biopsy as scheduled  - Follow up with general surgery for posterior neck masses, suspect cyst/lipoma, referral placed    Return in about 1 month (around 4/9/2023)  Subjective:        Patient ID: Jenny West is a 71 y o  female  Chief Complaint   Patient presents with   • Follow-up       Moriah Leon is a 25-year-old female with history of hypertension, hyperlipidemia, prediabetes, and morbid obesity, presenting for follow-up  Patient had an incidental thyroid nodule noted on CTA 3/2022  Thyroid ultrasound 5/2022 revealed an enlarged heterogeneous thyroid gland with multiple nodules for which biopsy was recommended  Unfortunately patient did not follow-up  Repeat thyroid ultrasound done 2/2023 revealed multiple nodules, 2 meeting criteria for biopsy  Patient is scheduled for ultrasound-guided biopsy 4/3/2023  TSH drawn 2/20/2023 was WNL at 0 90  Patient also has 2 nodules on her posterior left neck for which a soft tissue ultrasound was ordered previously  This was not obtained  Thyroid ultrasound revealed an indeterminate hypoechoic region in the posterior neck with a tract to the skin surface  The area has not been causing her any pain  She denies open wounds or drainage  Hypertension is currently managed with amlodipine 5 mg and metoprolol 25 mg daily  /80 today  Hyperlipidemia is managed with atorvastatin 40 mg daily  Lipid panel from 3/2022 with total cholesterol 244,   Last mammogram was approximately 5 years ago  Patient has never had any colon cancer screening        The following portions of the patient's history were reviewed and updated as appropriate: allergies, current medications, past family history, past medical history, past social history, past surgical history and problem list     Patient Active Problem List   Diagnosis   • Thyroid nodule   • Stroke-like symptoms   • Primary hypertension   • Class 3 severe obesity due to excess calories with serious comorbidity and body mass index (BMI) of 40 0 to 44 9 in adult [facility])   • Prediabetes   • Dyslipidemia       Current Outpatient Medications   Medication Sig Dispense Refill   • amLODIPine (NORVASC) 5 mg tablet Take 1 tablet (5 mg total) by mouth daily 90 tablet 0   • artificial tear (LUBRIFRESH P M ) 83-15 % ophthalmic ointment Administer to the right eye daily at bedtime 3 5 g 0   • atorvastatin (LIPITOR) 40 mg tablet Take 1 tablet (40 mg total) by mouth every evening 90 tablet 0   • metoprolol succinate (TOPROL-XL) 25 mg 24 hr tablet Take 1 tablet (25 mg total) by mouth daily 90 tablet 0     No current facility-administered medications for this visit          Past Medical History:   Diagnosis Date   • Hypercholesterolemia    • Hypertension         Past Surgical History:   Procedure Laterality Date   • GALLBLADDER SURGERY  2008        Social History     Socioeconomic History   • Marital status: /Civil Union     Spouse name: Not on file   • Number of children: Not on file   • Years of education: Not on file   • Highest education level: Not on file   Occupational History   • Not on file   Tobacco Use   • Smoking status: Every Day     Packs/day: 1 00     Types: Cigarettes   • Smokeless tobacco: Never   Vaping Use   • Vaping Use: Never used   Substance and Sexual Activity   • Alcohol use: Not Currently     Comment: socially   • Drug use: No   • Sexual activity: Yes     Partners: Male   Other Topics Concern   • Not on file   Social History Narrative   • Not on file     Social Determinants of Health     Financial Resource Strain: Not on file   Food Insecurity: No Food Insecurity   • Worried About Running Out of Food in the Last Year: Never true   • Ran Out of Food in the Last Year: Never true   Transportation Needs: No Transportation Needs   • Lack of Transportation (Medical): No   • Lack of Transportation (Non-Medical): No   Physical Activity: Not on file   Stress: Not on file   Social Connections: Not on file   Intimate Partner Violence: Not on file   Housing Stability: Unknown   • Unable to Pay for Housing in the Last Year: No   • Number of Places Lived in the Last Year: Not on file   • Unstable Housing in the Last Year: No        Review of Systems   Constitutional: Positive for unexpected weight change  Negative for chills, diaphoresis and fever  Respiratory: Negative for cough, chest tightness, shortness of breath and wheezing  Cardiovascular: Negative for chest pain, palpitations and leg swelling  Gastrointestinal: Negative for abdominal pain, constipation, diarrhea, nausea and vomiting  Skin: Negative for rash and wound  Neurological: Negative for dizziness, syncope, light-headedness and headaches  Objective:      /80 (BP Location: Left arm, Patient Position: Sitting, Cuff Size: Large)   Pulse 65   Temp (!) 97 1 °F (36 2 °C) (Tympanic)   Resp 16   Ht 5' 7" (1 702 m)   Wt 117 kg (258 lb 9 6 oz)   SpO2 98%   BMI 40 50 kg/m²          Physical Exam  Vitals and nursing note reviewed  Constitutional:       General: She is not in acute distress  Appearance: Normal appearance  She is obese  HENT:      Head: Normocephalic and atraumatic  Eyes:      Extraocular Movements: Extraocular movements intact  Conjunctiva/sclera: Conjunctivae normal       Pupils: Pupils are equal, round, and reactive to light  Neck:        Comments: 2 subcutaneous nodules palpated, posterior left neck  Cardiovascular:      Rate and Rhythm: Normal rate and regular rhythm  Heart sounds: Normal heart sounds  No murmur heard  Pulmonary:      Effort: Pulmonary effort is normal  No respiratory distress  Breath sounds: Normal breath sounds  No wheezing  Musculoskeletal:      Right lower leg: No edema        Left lower leg: No edema  Skin:     General: Skin is warm  Neurological:      General: No focal deficit present  Mental Status: She is alert and oriented to person, place, and time  Cranial Nerves: No cranial nerve deficit     Psychiatric:         Mood and Affect: Mood normal          Behavior: Behavior normal

## 2023-04-03 ENCOUNTER — HOSPITAL ENCOUNTER (OUTPATIENT)
Dept: ULTRASOUND IMAGING | Facility: HOSPITAL | Age: 73
Discharge: HOME | End: 2023-04-03
Admitting: RADIOLOGY

## 2023-04-03 DIAGNOSIS — E04.2 MULTIPLE THYROID NODULES: ICD-10-CM

## 2023-04-03 RX ORDER — LIDOCAINE HYDROCHLORIDE 5 MG/ML
5 INJECTION, SOLUTION INFILTRATION; INTRAVENOUS ONCE
Status: COMPLETED | OUTPATIENT
Start: 2023-04-03 | End: 2023-04-03

## 2023-04-03 RX ADMIN — LIDOCAINE HYDROCHLORIDE 25 MG: 5 INJECTION, SOLUTION INFILTRATION; INTRAVENOUS at 14:00

## 2023-04-06 ENCOUNTER — RA CDI HCC (OUTPATIENT)
Dept: OTHER | Facility: HOSPITAL | Age: 73
End: 2023-04-06

## 2023-04-06 NOTE — PROGRESS NOTES
[id number]  [facility]  coding opportunities          Chart Reviewed number of suggestions sent to Provider: 1     Patients Insurance     Medicare Insurance: [employer]

## 2023-04-10 PROBLEM — L72.0 EPIDERMOID CYST OF SKIN: Status: ACTIVE | Noted: 2023-04-10

## 2023-05-22 ENCOUNTER — VBI (OUTPATIENT)
Dept: ADMINISTRATIVE | Facility: OTHER | Age: 73
End: 2023-05-22

## 2023-06-01 DIAGNOSIS — E78.5 DYSLIPIDEMIA: ICD-10-CM

## 2023-06-01 DIAGNOSIS — I10 PRIMARY HYPERTENSION: ICD-10-CM

## 2023-06-01 RX ORDER — AMLODIPINE BESYLATE 5 MG/1
TABLET ORAL
Qty: 90 TABLET | Refills: 0 | Status: SHIPPED | OUTPATIENT
Start: 2023-06-01

## 2023-06-01 RX ORDER — METOPROLOL SUCCINATE 25 MG/1
TABLET, EXTENDED RELEASE ORAL
Qty: 90 TABLET | Refills: 0 | Status: SHIPPED | OUTPATIENT
Start: 2023-06-01

## 2023-06-02 RX ORDER — ATORVASTATIN CALCIUM 40 MG/1
TABLET, FILM COATED ORAL
Qty: 90 TABLET | Refills: 0 | Status: SHIPPED | OUTPATIENT
Start: 2023-06-02

## 2023-06-23 ENCOUNTER — VBI (OUTPATIENT)
Dept: ADMINISTRATIVE | Facility: OTHER | Age: 73
End: 2023-06-23

## 2023-07-26 ENCOUNTER — OFFICE VISIT (OUTPATIENT)
Dept: SURGERY | Facility: CLINIC | Age: 73
End: 2023-07-26
Payer: COMMERCIAL

## 2023-07-26 VITALS
HEIGHT: 67 IN | BODY MASS INDEX: 40.34 KG/M2 | OXYGEN SATURATION: 97 % | TEMPERATURE: 97.3 F | DIASTOLIC BLOOD PRESSURE: 70 MMHG | WEIGHT: 257 LBS | HEART RATE: 75 BPM | RESPIRATION RATE: 18 BRPM | SYSTOLIC BLOOD PRESSURE: 132 MMHG

## 2023-07-26 DIAGNOSIS — L72.0 EPIDERMOID CYST OF SKIN: Primary | ICD-10-CM

## 2023-07-26 PROCEDURE — 99213 OFFICE O/P EST LOW 20 MIN: CPT | Performed by: SURGERY

## 2023-07-26 NOTE — PROGRESS NOTES
Assessment/Plan:    Epidermoid cyst of skin  Patient returns for routine scheduled follow-up regarding the 2 epidermoid cysts of the posterior midline of the neck. Patient denies any new symptoms referable to the 2 lumps. She denies any appreciable change in their size. On physical examination she is well-appearing. Pleasant competent reliable as a historian. The epidermoid cyst of the posterior midline of the left neck remain stable in size. The more cephalad, larger lesion is 12 mm in maximum diameter. The more caudal midline lesion is 9 mm in maximum diameter. The patient does have a diminutive epidermoid cyst on the left chest which is not infected. The patient has 3 epidermoid cysts that are asymptomatic presently. No definitive surgery is required at the present time. I look forward to seeing her back in 6 months time. She has been encouraged encouraged to follow-up sooner on an as-needed basis for progression of symptoms. Subjective:      Patient ID: Rito Marshall is a 71 y.o. female. Patient is here today for a 3 mo f/u neck mass. Patient states a couple of months ago she had benign nodules removed on both sides of her neck. Patient states the incisions are healed and denies drainage, pain or fevers/chills. Patient has noticed any new masses around her neck. There a lump located on her chest near her left breast. Patient denies pain, or drainage. Danii PARSONS MA        The following portions of the patient's history were reviewed and updated as appropriate: allergies, current medications, past family history, past medical history, past social history, past surgical history and problem list.    Review of Systems   Constitutional: Negative for chills and fever. HENT: Negative for ear pain and sore throat. Eyes: Negative for pain and visual disturbance. Respiratory: Negative for cough and shortness of breath. Cardiovascular: Negative for chest pain and palpitations. Gastrointestinal: Negative for abdominal pain and vomiting. Genitourinary: Negative for dysuria and hematuria. Musculoskeletal: Negative for arthralgias and back pain. Skin: Negative for color change and rash. Neurological: Negative for seizures and syncope. All other systems reviewed and are negative. Objective:      /70 (BP Location: Left arm, Patient Position: Sitting, Cuff Size: Large)   Pulse 75   Temp (!) 97.3 °F (36.3 °C) (Temporal)   Resp 18   Ht 5' 7" (1.702 m)   Wt 117 kg (257 lb)   SpO2 97%   BMI 40.25 kg/m²          Physical Exam  Constitutional:       Appearance: She is well-developed. HENT:      Head: Normocephalic and atraumatic. Eyes:      Conjunctiva/sclera: Conjunctivae normal.      Pupils: Pupils are equal, round, and reactive to light. Cardiovascular:      Rate and Rhythm: Normal rate and regular rhythm. Pulmonary:      Effort: Pulmonary effort is normal.      Breath sounds: Normal breath sounds. Abdominal:      General: Bowel sounds are normal.      Palpations: Abdomen is soft. Musculoskeletal:         General: Normal range of motion. Cervical back: Normal range of motion and neck supple. Skin:     General: Skin is warm and dry. Comments: Skin exam as described above   Neurological:      Mental Status: She is alert and oriented to person, place, and time. Psychiatric:         Behavior: Behavior normal.         Thought Content:  Thought content normal.         Judgment: Judgment normal.

## 2023-07-26 NOTE — ASSESSMENT & PLAN NOTE
Patient returns for routine scheduled follow-up regarding the 2 epidermoid cysts of the posterior midline of the neck. Patient denies any new symptoms referable to the 2 lumps. She denies any appreciable change in their size. On physical examination she is well-appearing. Pleasant competent reliable as a historian. The epidermoid cyst of the posterior midline of the left neck remain stable in size. The more cephalad, larger lesion is 12 mm in maximum diameter. The more caudal midline lesion is 9 mm in maximum diameter. The patient does have a diminutive epidermoid cyst on the left chest which is not infected. The patient has 3 epidermoid cysts that are asymptomatic presently. No definitive surgery is required at the present time. I look forward to seeing her back in 6 months time. She has been encouraged encouraged to follow-up sooner on an as-needed basis for progression of symptoms.

## 2023-09-15 DIAGNOSIS — I10 PRIMARY HYPERTENSION: ICD-10-CM

## 2023-09-15 DIAGNOSIS — E78.5 DYSLIPIDEMIA: ICD-10-CM

## 2023-09-15 RX ORDER — AMLODIPINE BESYLATE 5 MG/1
TABLET ORAL
Qty: 90 TABLET | Refills: 0 | Status: SHIPPED | OUTPATIENT
Start: 2023-09-15

## 2023-09-15 RX ORDER — METOPROLOL SUCCINATE 25 MG/1
TABLET, EXTENDED RELEASE ORAL
Qty: 90 TABLET | Refills: 0 | Status: SHIPPED | OUTPATIENT
Start: 2023-09-15

## 2023-09-18 RX ORDER — ATORVASTATIN CALCIUM 40 MG/1
TABLET, FILM COATED ORAL
Qty: 90 TABLET | Refills: 0 | Status: SHIPPED | OUTPATIENT
Start: 2023-09-18

## 2024-01-24 ENCOUNTER — TELEPHONE (OUTPATIENT)
Dept: SURGERY | Facility: CLINIC | Age: 74
End: 2024-01-24

## 2024-01-24 NOTE — TELEPHONE ENCOUNTER
Called pt but there was no answer and I was unable to leave a voicemail due to the answering services she has. I sent a text to her mobile phone and mailed a letter to her home.

## 2024-05-03 ENCOUNTER — VBI (OUTPATIENT)
Dept: ADMINISTRATIVE | Facility: OTHER | Age: 74
End: 2024-05-03

## 2024-06-24 ENCOUNTER — OFFICE VISIT (OUTPATIENT)
Dept: FAMILY MEDICINE CLINIC | Facility: HOME HEALTHCARE | Age: 74
End: 2024-06-24
Payer: COMMERCIAL

## 2024-06-24 VITALS
RESPIRATION RATE: 18 BRPM | TEMPERATURE: 98.4 F | BODY MASS INDEX: 38.8 KG/M2 | HEIGHT: 67 IN | OXYGEN SATURATION: 93 % | DIASTOLIC BLOOD PRESSURE: 84 MMHG | HEART RATE: 93 BPM | SYSTOLIC BLOOD PRESSURE: 150 MMHG | WEIGHT: 247.2 LBS

## 2024-06-24 DIAGNOSIS — Z23 ENCOUNTER FOR IMMUNIZATION: ICD-10-CM

## 2024-06-24 DIAGNOSIS — Z12.11 SCREENING FOR COLON CANCER: ICD-10-CM

## 2024-06-24 DIAGNOSIS — E66.01 CLASS 2 SEVERE OBESITY DUE TO EXCESS CALORIES WITH SERIOUS COMORBIDITY AND BODY MASS INDEX (BMI) OF 38.0 TO 38.9 IN ADULT (HCC): ICD-10-CM

## 2024-06-24 DIAGNOSIS — I10 PRIMARY HYPERTENSION: ICD-10-CM

## 2024-06-24 DIAGNOSIS — R73.03 PREDIABETES: ICD-10-CM

## 2024-06-24 DIAGNOSIS — Z13.820 ENCOUNTER FOR OSTEOPOROSIS SCREENING IN ASYMPTOMATIC POSTMENOPAUSAL PATIENT: ICD-10-CM

## 2024-06-24 DIAGNOSIS — R44.1 VISUAL HALLUCINATIONS: ICD-10-CM

## 2024-06-24 DIAGNOSIS — Z11.59 NEED FOR HEPATITIS C SCREENING TEST: ICD-10-CM

## 2024-06-24 DIAGNOSIS — Z78.0 ENCOUNTER FOR OSTEOPOROSIS SCREENING IN ASYMPTOMATIC POSTMENOPAUSAL PATIENT: ICD-10-CM

## 2024-06-24 DIAGNOSIS — E78.5 DYSLIPIDEMIA: ICD-10-CM

## 2024-06-24 DIAGNOSIS — Z12.31 ENCOUNTER FOR SCREENING MAMMOGRAM FOR BREAST CANCER: ICD-10-CM

## 2024-06-24 DIAGNOSIS — Z00.00 MEDICARE ANNUAL WELLNESS VISIT, SUBSEQUENT: Primary | ICD-10-CM

## 2024-06-24 PROBLEM — Z72.0 TOBACCO USE: Status: ACTIVE | Noted: 2024-06-24

## 2024-06-24 PROBLEM — E66.812 CLASS 2 SEVERE OBESITY DUE TO EXCESS CALORIES WITH SERIOUS COMORBIDITY AND BODY MASS INDEX (BMI) OF 38.0 TO 38.9 IN ADULT (HCC): Status: ACTIVE | Noted: 2022-03-30

## 2024-06-24 LAB
ALBUMIN SERPL BCG-MCNC: 3.7 G/DL (ref 3.5–5)
ALP SERPL-CCNC: 113 U/L (ref 34–104)
ALT SERPL W P-5'-P-CCNC: 18 U/L (ref 7–52)
ANION GAP SERPL CALCULATED.3IONS-SCNC: 11 MMOL/L (ref 4–13)
AST SERPL W P-5'-P-CCNC: 19 U/L (ref 13–39)
BASOPHILS # BLD AUTO: 0.07 THOUSANDS/ÂΜL (ref 0–0.1)
BASOPHILS NFR BLD AUTO: 1 % (ref 0–1)
BILIRUB SERPL-MCNC: 0.57 MG/DL (ref 0.2–1)
BUN SERPL-MCNC: 12 MG/DL (ref 5–25)
CALCIUM SERPL-MCNC: 9.6 MG/DL (ref 8.4–10.2)
CHLORIDE SERPL-SCNC: 106 MMOL/L (ref 96–108)
CHOLEST SERPL-MCNC: 219 MG/DL
CO2 SERPL-SCNC: 25 MMOL/L (ref 21–32)
CREAT SERPL-MCNC: 0.88 MG/DL (ref 0.6–1.3)
EOSINOPHIL # BLD AUTO: 0.17 THOUSAND/ÂΜL (ref 0–0.61)
EOSINOPHIL NFR BLD AUTO: 2 % (ref 0–6)
ERYTHROCYTE [DISTWIDTH] IN BLOOD BY AUTOMATED COUNT: 13.1 % (ref 11.6–15.1)
EST. AVERAGE GLUCOSE BLD GHB EST-MCNC: 123 MG/DL
FOLATE SERPL-MCNC: 8.9 NG/ML
GFR SERPL CREATININE-BSD FRML MDRD: 65 ML/MIN/1.73SQ M
GLUCOSE P FAST SERPL-MCNC: 94 MG/DL (ref 65–99)
HBA1C MFR BLD: 5.9 %
HCT VFR BLD AUTO: 52.1 % (ref 34.8–46.1)
HCV AB SER QL: NORMAL
HDLC SERPL-MCNC: 41 MG/DL
HGB BLD-MCNC: 16.7 G/DL (ref 11.5–15.4)
IMM GRANULOCYTES # BLD AUTO: 0.04 THOUSAND/UL (ref 0–0.2)
IMM GRANULOCYTES NFR BLD AUTO: 0 % (ref 0–2)
LDLC SERPL CALC-MCNC: 153 MG/DL (ref 0–100)
LYMPHOCYTES # BLD AUTO: 2.58 THOUSANDS/ÂΜL (ref 0.6–4.47)
LYMPHOCYTES NFR BLD AUTO: 27 % (ref 14–44)
MCH RBC QN AUTO: 32.2 PG (ref 26.8–34.3)
MCHC RBC AUTO-ENTMCNC: 32.1 G/DL (ref 31.4–37.4)
MCV RBC AUTO: 100 FL (ref 82–98)
MONOCYTES # BLD AUTO: 0.98 THOUSAND/ÂΜL (ref 0.17–1.22)
MONOCYTES NFR BLD AUTO: 10 % (ref 4–12)
NEUTROPHILS # BLD AUTO: 5.57 THOUSANDS/ÂΜL (ref 1.85–7.62)
NEUTS SEG NFR BLD AUTO: 60 % (ref 43–75)
NONHDLC SERPL-MCNC: 178 MG/DL
NRBC BLD AUTO-RTO: 0 /100 WBCS
PLATELET # BLD AUTO: 270 THOUSANDS/UL (ref 149–390)
PMV BLD AUTO: 11 FL (ref 8.9–12.7)
POTASSIUM SERPL-SCNC: 3.6 MMOL/L (ref 3.5–5.3)
PROT SERPL-MCNC: 6.9 G/DL (ref 6.4–8.4)
RBC # BLD AUTO: 5.19 MILLION/UL (ref 3.81–5.12)
SODIUM SERPL-SCNC: 142 MMOL/L (ref 135–147)
TRIGL SERPL-MCNC: 124 MG/DL
TSH SERPL DL<=0.05 MIU/L-ACNC: 1.14 UIU/ML (ref 0.45–4.5)
VIT B12 SERPL-MCNC: 288 PG/ML (ref 180–914)
WBC # BLD AUTO: 9.41 THOUSAND/UL (ref 4.31–10.16)

## 2024-06-24 PROCEDURE — 86803 HEPATITIS C AB TEST: CPT | Performed by: PHYSICIAN ASSISTANT

## 2024-06-24 PROCEDURE — 80061 LIPID PANEL: CPT | Performed by: PHYSICIAN ASSISTANT

## 2024-06-24 PROCEDURE — G0439 PPPS, SUBSEQ VISIT: HCPCS | Performed by: PHYSICIAN ASSISTANT

## 2024-06-24 PROCEDURE — 84443 ASSAY THYROID STIM HORMONE: CPT | Performed by: PHYSICIAN ASSISTANT

## 2024-06-24 PROCEDURE — 80053 COMPREHEN METABOLIC PANEL: CPT | Performed by: PHYSICIAN ASSISTANT

## 2024-06-24 PROCEDURE — 85025 COMPLETE CBC W/AUTO DIFF WBC: CPT | Performed by: PHYSICIAN ASSISTANT

## 2024-06-24 PROCEDURE — 82607 VITAMIN B-12: CPT | Performed by: PHYSICIAN ASSISTANT

## 2024-06-24 PROCEDURE — 83036 HEMOGLOBIN GLYCOSYLATED A1C: CPT | Performed by: PHYSICIAN ASSISTANT

## 2024-06-24 PROCEDURE — 86780 TREPONEMA PALLIDUM: CPT | Performed by: PHYSICIAN ASSISTANT

## 2024-06-24 PROCEDURE — 82746 ASSAY OF FOLIC ACID SERUM: CPT | Performed by: PHYSICIAN ASSISTANT

## 2024-06-24 RX ORDER — METOPROLOL SUCCINATE 25 MG/1
25 TABLET, EXTENDED RELEASE ORAL DAILY
Qty: 90 TABLET | Refills: 0 | Status: SHIPPED | OUTPATIENT
Start: 2024-06-24

## 2024-06-24 RX ORDER — ATORVASTATIN CALCIUM 40 MG/1
40 TABLET, FILM COATED ORAL EVERY EVENING
Qty: 90 TABLET | Refills: 0 | Status: SHIPPED | OUTPATIENT
Start: 2024-06-24

## 2024-06-24 RX ORDER — AMLODIPINE BESYLATE 5 MG/1
5 TABLET ORAL DAILY
Qty: 90 TABLET | Refills: 0 | Status: SHIPPED | OUTPATIENT
Start: 2024-06-24

## 2024-06-24 NOTE — PATIENT INSTRUCTIONS
Medicare Preventive Visit Patient Instructions  Thank you for completing your Welcome to Medicare Visit or Medicare Annual Wellness Visit today. Your next wellness visit will be due in one year (6/25/2025).  The screening/preventive services that you may require over the next 5-10 years are detailed below. Some tests may not apply to you based off risk factors and/or age. Screening tests ordered at today's visit but not completed yet may show as past due. Also, please note that scanned in results may not display below.  Preventive Screenings:  Service Recommendations Previous Testing/Comments   Colorectal Cancer Screening  * Colonoscopy    * Fecal Occult Blood Test (FOBT)/Fecal Immunochemical Test (FIT)  * Fecal DNA/Cologuard Test  * Flexible Sigmoidoscopy Age: 45-75 years old   Colonoscopy: every 10 years (may be performed more frequently if at higher risk)  OR  FOBT/FIT: every 1 year  OR  Cologuard: every 3 years  OR  Sigmoidoscopy: every 5 years  Screening may be recommended earlier than age 45 if at higher risk for colorectal cancer. Also, an individualized decision between you and your healthcare provider will decide whether screening between the ages of 76-85 would be appropriate. Colonoscopy: Not on file  FOBT/FIT: Not on file  Cologuard: Not on file  Sigmoidoscopy: Not on file          Breast Cancer Screening Age: 40+ years old  Frequency: every 1-2 years  Not required if history of left and right mastectomy Mammogram: 04/23/2018        Cervical Cancer Screening Between the ages of 21-29, pap smear recommended once every 3 years.   Between the ages of 30-65, can perform pap smear with HPV co-testing every 5 years.   Recommendations may differ for women with a history of total hysterectomy, cervical cancer, or abnormal pap smears in past. Pap Smear: Not on file        Hepatitis C Screening Once for adults born between 1945 and 1965  More frequently in patients at high risk for Hepatitis C Hep C Antibody: Not  on file        Diabetes Screening 1-2 times per year if you're at risk for diabetes or have pre-diabetes Fasting glucose: No results in last 5 years (No results in last 5 years)  A1C: 5.8 % (3/30/2022)      Cholesterol Screening Once every 5 years if you don't have a lipid disorder. May order more often based on risk factors. Lipid panel: 03/30/2022          Other Preventive Screenings Covered by Medicare:  Abdominal Aortic Aneurysm (AAA) Screening: covered once if your at risk. You're considered to be at risk if you have a family history of AAA.  Lung Cancer Screening: covers low dose CT scan once per year if you meet all of the following conditions: (1) Age 55-77; (2) No signs or symptoms of lung cancer; (3) Current smoker or have quit smoking within the last 15 years; (4) You have a tobacco smoking history of at least 20 pack years (packs per day multiplied by number of years you smoked); (5) You get a written order from a healthcare provider.  Glaucoma Screening: covered annually if you're considered high risk: (1) You have diabetes OR (2) Family history of glaucoma OR (3)  aged 50 and older OR (4)  American aged 65 and older  Osteoporosis Screening: covered every 2 years if you meet one of the following conditions: (1) You're estrogen deficient and at risk for osteoporosis based off medical history and other findings; (2) Have a vertebral abnormality; (3) On glucocorticoid therapy for more than 3 months; (4) Have primary hyperparathyroidism; (5) On osteoporosis medications and need to assess response to drug therapy.   Last bone density test (DXA Scan): Not on file.  HIV Screening: covered annually if you're between the age of 15-65. Also covered annually if you are younger than 15 and older than 65 with risk factors for HIV infection. For pregnant patients, it is covered up to 3 times per pregnancy.    Immunizations:  Immunization Recommendations   Influenza Vaccine Annual influenza  vaccination during flu season is recommended for all persons aged >= 6 months who do not have contraindications   Pneumococcal Vaccine   * Pneumococcal conjugate vaccine = PCV13 (Prevnar 13), PCV15 (Vaxneuvance), PCV20 (Prevnar 20)  * Pneumococcal polysaccharide vaccine = PPSV23 (Pneumovax) Adults 19-65 yo with certain risk factors or if 65+ yo  If never received any pneumonia vaccine: recommend Prevnar 20 (PCV20)  Give PCV20 if previously received 1 dose of PCV13 or PPSV23   Hepatitis B Vaccine 3 dose series if at intermediate or high risk (ex: diabetes, end stage renal disease, liver disease)   Respiratory syncytial virus (RSV) Vaccine - COVERED BY MEDICARE PART D  * RSVPreF3 (Arexvy) CDC recommends that adults 60 years of age and older may receive a single dose of RSV vaccine using shared clinical decision-making (SCDM)   Tetanus (Td) Vaccine - COST NOT COVERED BY MEDICARE PART B Following completion of primary series, a booster dose should be given every 10 years to maintain immunity against tetanus. Td may also be given as tetanus wound prophylaxis.   Tdap Vaccine - COST NOT COVERED BY MEDICARE PART B Recommended at least once for all adults. For pregnant patients, recommended with each pregnancy.   Shingles Vaccine (Shingrix) - COST NOT COVERED BY MEDICARE PART B  2 shot series recommended in those 19 years and older who have or will have weakened immune systems or those 50 years and older     Health Maintenance Due:      Topic Date Due   • Hepatitis C Screening  Never done   • Colorectal Cancer Screening  Never done   • Breast Cancer Screening: Mammogram  04/23/2019     Immunizations Due:      Topic Date Due   • Pneumococcal Vaccine: 65+ Years (1 of 2 - PCV) Never done   • COVID-19 Vaccine (2 - 2023-24 season) 09/01/2023   • Influenza Vaccine (Season Ended) 09/01/2024     Advance Directives   What are advance directives?  Advance directives are legal documents that state your wishes and plans for medical  care. These plans are made ahead of time in case you lose your ability to make decisions for yourself. Advance directives can apply to any medical decision, such as the treatments you want, and if you want to donate organs.   What are the types of advance directives?  There are many types of advance directives, and each state has rules about how to use them. You may choose a combination of any of the following:  Living will:  This is a written record of the treatment you want. You can also choose which treatments you do not want, which to limit, and which to stop at a certain time. This includes surgery, medicine, IV fluid, and tube feedings.   Durable power of  for healthcare (DPAHC):  This is a written record that states who you want to make healthcare choices for you when you are unable to make them for yourself. This person, called a proxy, is usually a family member or a friend. You may choose more than 1 proxy.  Do not resuscitate (DNR) order:  A DNR order is used in case your heart stops beating or you stop breathing. It is a request not to have certain forms of treatment, such as CPR. A DNR order may be included in other types of advance directives.  Medical directive:  This covers the care that you want if you are in a coma, near death, or unable to make decisions for yourself. You can list the treatments you want for each condition. Treatment may include pain medicine, surgery, blood transfusions, dialysis, IV or tube feedings, and a ventilator (breathing machine).  Values history:  This document has questions about your views, beliefs, and how you feel and think about life. This information can help others choose the care that you would choose.  Why are advance directives important?  An advance directive helps you control your care. Although spoken wishes may be used, it is better to have your wishes written down. Spoken wishes can be misunderstood, or not followed. Treatments may be given even if  you do not want them. An advance directive may make it easier for your family to make difficult choices about your care.   Cigarette Smoking and Your Health   Risks to your health if you smoke:  Nicotine and other chemicals found in tobacco damage every cell in your body. Even if you are a light smoker, you have an increased risk for cancer, heart disease, and lung disease. If you are pregnant or have diabetes, smoking increases your risk for complications.   Benefits to your health if you stop smoking:   You decrease respiratory symptoms such as coughing, wheezing, and shortness of breath.   You reduce your risk for cancers of the lung, mouth, throat, kidney, bladder, pancreas, stomach, and cervix. If you already have cancer, you increase the benefits of chemotherapy. You also reduce your risk for cancer returning or a second cancer from developing.   You reduce your risk for heart disease, blood clots, heart attack, and stroke.   You reduce your risk for lung infections, and diseases such as pneumonia, asthma, chronic bronchitis, and emphysema.  Your circulation improves. More oxygen can be delivered to your body. If you have diabetes, you lower your risk for complications, such as kidney, artery, and eye diseases. You also lower your risk for nerve damage. Nerve damage can lead to amputations, poor vision, and blindness.  You improve your body's ability to heal and to fight infections.  For more information and support to stop smoking:   [employer]  Phone: 8- 703 - [phone]  Web Address: www.[employer]  Weight Management   Why it is important to manage your weight:  Being overweight increases your risk of health conditions such as heart disease, high blood pressure, type 2 diabetes, and certain types of cancer. It can also increase your risk for osteoarthritis, sleep apnea, and other respiratory problems. Aim for a slow, steady weight loss. Even a small amount of weight loss can lower your risk of health  problems.  How to lose weight safely:  A safe and healthy way to lose weight is to eat fewer calories and get regular exercise. You can lose up about 1 pound a week by decreasing the number of calories you eat by 500 calories each day.   Healthy meal plan for weight management:  A healthy meal plan includes a variety of foods, contains fewer calories, and helps you stay healthy. A healthy meal plan includes the following:  Eat whole-grain foods more often.  A healthy meal plan should contain fiber. Fiber is the part of grains, fruits, and vegetables that is not broken down by your body. Whole-grain foods are healthy and provide extra fiber in your diet. Some examples of whole-grain foods are whole-wheat breads and pastas, oatmeal, brown rice, and bulgur.  Eat a variety of vegetables every day.  Include dark, leafy greens such as spinach, kale, isaac greens, and mustard greens. Eat yellow and orange vegetables such as carrots, sweet potatoes, and winter squash.   Eat a variety of fruits every day.  Choose fresh or canned fruit (canned in its own juice or light syrup) instead of juice. Fruit juice has very little or no fiber.  Eat low-fat dairy foods.  Drink fat-free (skim) milk or 1% milk. Eat fat-free yogurt and low-fat cottage cheese. Try low-fat cheeses such as mozzarella and other reduced-fat cheeses.  Choose meat and other protein foods that are low in fat.  Choose beans or other legumes such as split peas or lentils. Choose fish, skinless poultry (chicken or turkey), or lean cuts of red meat (beef or pork). Before you cook meat or poultry, cut off any visible fat.   Use less fat and oil.  Try baking foods instead of frying them. Add less fat, such as margarine, sour cream, regular salad dressing and mayonnaise to foods. Eat fewer high-fat foods. Some examples of high-fat foods include french fries, doughnuts, ice cream, and cakes.  Eat fewer sweets.  Limit foods and drinks that are high in sugar. This  includes candy, cookies, regular soda, and sweetened drinks.  Exercise:  Exercise at least 30 minutes per day on most days of the week. Some examples of exercise include walking, biking, dancing, and swimming. You can also fit in more physical activity by taking the stairs instead of the elevator or parking farther away from stores. Ask your healthcare provider about the best exercise plan for you.      © Copyright [employer] 2018 Information is for End User's use only and may not be sold, redistributed or otherwise used for commercial purposes. All illustrations and images included in CareNotes® are the copyrighted property of [employer] or [employer]

## 2024-06-24 NOTE — PROGRESS NOTES
Ambulatory Visit  Name: Jennie Hickey      : 1950      MRN: [id number]  Encounter Provider: Narciso Storm PA-C  Encounter Date: 2024   Encounter department: [facility]    Assessment & Plan   1. Medicare annual wellness visit, subsequent  2. Prediabetes  -     Hemoglobin A1C; Future  -     Hemoglobin A1C  3. Dyslipidemia  -     Lipid panel; Future  -     Lipid panel  -     atorvastatin (LIPITOR) 40 mg tablet; Take 1 tablet (40 mg total) by mouth every evening  4. Primary hypertension  -     CBC and differential; Future  -     Comprehensive metabolic panel; Future  -     TSH, 3rd generation with Free T4 reflex; Future  -     CBC and differential  -     Comprehensive metabolic panel  -     TSH, 3rd generation with Free T4 reflex  -     amLODIPine (NORVASC) 5 mg tablet; Take 1 tablet (5 mg total) by mouth daily  -     metoprolol succinate (TOPROL-XL) 25 mg 24 hr tablet; Take 1 tablet (25 mg total) by mouth daily  5. Class 2 severe obesity due to excess calories with serious comorbidity and body mass index (BMI) of 38.0 to 38.9 in adult (HCC)  6. Screening for colon cancer  -     Cologuard  7. Encounter for screening mammogram for breast cancer  -     Mammo screening bilateral w 3d & cad; Future; Expected date: 2024  8. Encounter for osteoporosis screening in asymptomatic postmenopausal patient  -     DXA bone density spine hip and pelvis; Future; Expected date: 2024  9. Need for hepatitis C screening test  -     Hepatitis C Antibody; Future  -     Hepatitis C Antibody  10. Encounter for immunization  -     Pneumococcal Conjugate Vaccine 20-valent (Pcv20)  11. Visual hallucinations  -     Vitamin B12/Folate, Serum Panel; Future  -     RPR-Syphilis Screening (Total Syphilis IGG/IGM); Future  -     CT head wo contrast; Future; Expected date: 2024  -     UA w Reflex to Microscopic w Reflex to Culture; Future  -     Vitamin B12/Folate, Serum Panel  -      RPR-Syphilis Screening (Total Syphilis IGG/IGM)  -     Ambulatory referral to Psych Services; Future    - Labs as ordered.  Will check B12/folate and RPR due to visual hallucinations  - CT head to evaluate for underlying lesions which could be causing hallucinations  - Consult Psych  - Patient denies SI/HI.  She is A&Ox3.   accompanied her to her appointment and is aware that these symptoms have been occurring.  Patient does not pose any immediate threat to herself or others.  We discussed ensuring any weapons at home are locked/unloaded.   is aware to take patient to the ED if experiencing worsening symptoms    Follow up in 1 week      Depression Screening and Follow-up Plan: Patient was screened for depression during today's encounter. They screened negative with a PHQ-2 score of 0.    Tobacco Cessation Counseling: Tobacco cessation counseling was provided. The patient is sincerely urged to quit consumption of tobacco. She is not ready to quit tobacco. Medication options not discussed.       Preventive health issues were discussed with patient, and age appropriate screening tests were ordered as noted in patient's After Visit Summary. Personalized health advice and appropriate referrals for health education or preventive services given if needed, as noted in patient's After Visit Summary.    History of Present Illness   {Disappearing Hyperlinks I Encounters * My Last Note * Since Last Visit * History :95473}    Jennie is a 73-year-old female with history of hypertension, hyperlipidemia, prediabetes, and morbid obesity, presenting for AWV.    Hypertension is currently managed with amlodipine 5 mg and metoprolol 25 mg daily.  BP slightly elevated today at 150/84.  Patient reports she has been out of her medication.    Hyperlipidemia is managed with atorvastatin 40 mg daily.  Lipid panel from 3/2022 with total cholesterol 244, .    Patient endorses visual hallucinations x 2 months.  This is a  new symptom for her.  She reports seeing people and animals.  Her hallucinations have threatened her with guns and knives, however, she states she is not afraid of them because she knows they are not real.  She reports feeling depressed sometimes due to the hallucinations because she feels that she is going crazy.  She is aware that these are not real.  Denies SI/HI.  Denies auditory hallucinations.  No history of mental illness.  Denies alcohol or illicit drug use. Denies inciting trauma or life events.       Patient Care Team:  Narciso Storm PA-C as PCP - General (Family Medicine)    Review of Systems   Constitutional:  Negative for chills, diaphoresis and fever.   Eyes:  Positive for visual disturbance.   Respiratory:  Negative for cough, chest tightness, shortness of breath and wheezing.    Cardiovascular:  Negative for chest pain, palpitations and leg swelling.   Gastrointestinal:  Negative for abdominal pain, blood in stool, constipation, diarrhea, nausea and vomiting.   Genitourinary:  Negative for difficulty urinating, dysuria, frequency, hematuria and urgency.   Skin:  Negative for rash and wound.   Neurological:  Negative for dizziness, syncope, weakness, light-headedness and headaches.   Psychiatric/Behavioral:  Positive for dysphoric mood and hallucinations. Negative for decreased concentration, self-injury, sleep disturbance and suicidal ideas. The patient is not nervous/anxious.      Medical History Reviewed by provider this encounter:  Tobacco  Allergies  Meds  Problems  Med Hx  Surg Hx  Fam Hx       Annual Wellness Visit Questionnaire   Jennie is here for her Subsequent Wellness visit.     Health Risk Assessment:   Patient rates overall health as very good. Patient feels that their physical health rating is slightly better. Patient is very satisfied with their life. Eyesight was rated as much worse. Hearing was rated as slightly worse. Patient feels that their emotional and mental health  rating is slightly better. Patients states they are never, rarely angry. Patient states they are always unusually tired/fatigued. Pain experienced in the last 7 days has been none. Patient states that she has experienced no weight loss or gain in last 6 months.     Depression Screening:   PHQ-2 Score: 0      Fall Risk Screening:   In the past year, patient has experienced: no history of falling in past year      Urinary Incontinence Screening:   Patient has not leaked urine accidently in the last six months.     Home Safety:  Patient has trouble with stairs inside or outside of their home. Patient has working smoke alarms and has no working carbon monoxide detector. Home safety hazards include: none.     Nutrition:   Current diet is Regular.     Medications:   Patient is currently taking over-the-counter supplements. OTC medications include: see medication list. Patient is not able to manage medications.     Activities of Daily Living (ADLs)/Instrumental Activities of Daily Living (IADLs):   Walk and transfer into and out of bed and chair?: Yes  Dress and groom yourself?: Yes    Bathe or shower yourself?: Yes    Feed yourself? Yes  Do your laundry/housekeeping?: Yes  Manage your money, pay your bills and track your expenses?: Yes  Make your own meals?: Yes    Do your own shopping?: No    Previous Hospitalizations:   Any hospitalizations or ED visits within the last 12 months?: No      Advance Care Planning:   Living will: No    Durable POA for healthcare: No    Advanced directive: No    Advanced directive counseling given: Yes    ACP document given: Yes      Cognitive Screening:   Provider or family/friend/caregiver concerned regarding cognition?: No    PREVENTIVE SCREENINGS      Cardiovascular Screening:    General: Risks and Benefits Discussed    Due for: Lipid Panel      Diabetes Screening:     General: Risks and Benefits Discussed    Due for: Blood Glucose      Colorectal Cancer Screening:     General: Risks and  Benefits Discussed    Due for: Cologuard      Breast Cancer Screening:     General: Risks and Benefits Discussed    Due for: Mammogram        Cervical Cancer Screening:    General: Screening Not Indicated      Osteoporosis Screening:    General: Screening Not Indicated      Abdominal Aortic Aneurysm (AAA) Screening:        General: Screening Not Indicated      Lung Cancer Screening:     General: Risks and Benefits Discussed      Hepatitis C Screening:    General: Risks and Benefits Discussed    Screening, Brief Intervention, and Referral to Treatment (SBIRT)    Screening  Typical number of drinks in a day: 0  Typical number of drinks in a week: 0  Interpretation: Low risk drinking behavior.    Single Item Drug Screening:  How often have you used an illegal drug (including marijuana) or a prescription medication for non-medical reasons in the past year? never    Single Item Drug Screen Score: 0  Interpretation: Negative screen for possible drug use disorder    Social Determinants of Health     Food Insecurity: No Food Insecurity (6/24/2024)    Hunger Vital Sign    • Worried About Running Out of Food in the Last Year: Never true    • Ran Out of Food in the Last Year: Never true   Transportation Needs: No Transportation Needs (6/24/2024)    PRAPARE - Transportation    • Lack of Transportation (Medical): No    • Lack of Transportation (Non-Medical): No   Housing Stability: Unknown (6/24/2024)    Housing Stability Vital Sign    • Unable to Pay for Housing in the Last Year: No    • Homeless in the Last Year: No   Utilities: Not At Risk (6/24/2024)    [facility] Utilities    • Threatened with loss of utilities: No     No results found.    Objective   {Disappearing Hyperlinks   Review Vitals * Enter New Vitals * Results Review * Labs * Imaging * Cardiology * Procedures * Lung Cancer Screening :32044}  /84 (BP Location: Left arm, Patient Position: Sitting, Cuff Size: Large)   Pulse 93   Temp 98.4 °F (36.9 °C)   Resp 18    "Ht 5' 7\" (1.702 m)   Wt 112 kg (247 lb 3.2 oz)   SpO2 93%   BMI 38.72 kg/m²     Physical Exam  Vitals and nursing note reviewed.   Constitutional:       General: She is not in acute distress.     Appearance: Normal appearance. She is obese.   HENT:      Head: Normocephalic and atraumatic.   Eyes:      Extraocular Movements: Extraocular movements intact.      Conjunctiva/sclera: Conjunctivae normal.      Pupils: Pupils are equal, round, and reactive to light.   Cardiovascular:      Rate and Rhythm: Normal rate and regular rhythm.      Heart sounds: Normal heart sounds. No murmur heard.  Pulmonary:      Effort: Pulmonary effort is normal. No respiratory distress.      Breath sounds: Normal breath sounds. No wheezing.   Skin:     General: Skin is warm and dry.   Neurological:      General: No focal deficit present.      Mental Status: She is alert and oriented to person, place, and time.      Cranial Nerves: No cranial nerve deficit.      Motor: No weakness.   Psychiatric:         Attention and Perception: She perceives visual hallucinations. She does not perceive auditory hallucinations.         Mood and Affect: Mood and affect normal.         Behavior: Behavior normal.         Thought Content: Thought content normal. Thought content is not delusional. Thought content does not include homicidal or suicidal ideation.       Administrative Statements {Disappearing Hyperlinks I  [facility]85388}          "

## 2024-06-25 ENCOUNTER — TELEPHONE (OUTPATIENT)
Dept: PSYCHIATRY | Facility: CLINIC | Age: 74
End: 2024-06-25

## 2024-06-25 LAB — TREPONEMA PALLIDUM IGG+IGM AB [PRESENCE] IN SERUM OR PLASMA BY IMMUNOASSAY: NORMAL

## 2024-06-25 NOTE — TELEPHONE ENCOUNTER
"Contacted PT. in regards to ASAP/STAT Referral, not able to LVM, VM stats \"calls for this number are being screened with smart call blocker\" repeatedly.  "

## 2024-06-26 ENCOUNTER — TELEPHONE (OUTPATIENT)
Dept: PSYCHIATRY | Facility: CLINIC | Age: 74
End: 2024-06-26

## 2024-06-26 NOTE — TELEPHONE ENCOUNTER
Contacted patient in regards to ASAP Referral  in attempts to verify patient's needs of services and add patient to proper wait list. Unable to LVM. VM states call has been screened by smart blocker and will not let the person LVM. Second attempt.

## 2024-06-27 ENCOUNTER — TELEPHONE (OUTPATIENT)
Dept: PSYCHIATRY | Facility: CLINIC | Age: 74
End: 2024-06-27

## 2024-06-27 NOTE — TELEPHONE ENCOUNTER
Contacted patient in regards to STAT Referral in attempts to verify patient's needs of services and add patient to proper wait list. Unable to LMV. This time tried another number and no one answered at the end after ringing so much turned to a busy signal. 3rd attempt. Closing referral.

## 2024-06-28 ENCOUNTER — HOSPITAL ENCOUNTER (OUTPATIENT)
Dept: CT IMAGING | Facility: HOSPITAL | Age: 74
End: 2024-06-28
Payer: COMMERCIAL

## 2024-06-28 DIAGNOSIS — R44.1 VISUAL HALLUCINATIONS: ICD-10-CM

## 2024-06-28 PROCEDURE — 70450 CT HEAD/BRAIN W/O DYE: CPT

## 2024-07-03 ENCOUNTER — HOSPITAL ENCOUNTER (OUTPATIENT)
Dept: MAMMOGRAPHY | Facility: HOSPITAL | Age: 74
Discharge: HOME | End: 2024-07-03

## 2024-07-03 ENCOUNTER — HOSPITAL ENCOUNTER (OUTPATIENT)
Dept: BONE DENSITY | Facility: HOSPITAL | Age: 74
Discharge: HOME | End: 2024-07-03

## 2024-07-03 DIAGNOSIS — Z12.31 ENCOUNTER FOR SCREENING MAMMOGRAM FOR BREAST CANCER: ICD-10-CM

## 2024-07-03 DIAGNOSIS — Z13.820 ENCOUNTER FOR OSTEOPOROSIS SCREENING IN ASYMPTOMATIC POSTMENOPAUSAL PATIENT: ICD-10-CM

## 2024-07-03 DIAGNOSIS — Z78.0 ENCOUNTER FOR OSTEOPOROSIS SCREENING IN ASYMPTOMATIC POSTMENOPAUSAL PATIENT: ICD-10-CM

## 2024-07-05 ENCOUNTER — OFFICE VISIT (OUTPATIENT)
Dept: FAMILY MEDICINE CLINIC | Facility: HOME HEALTHCARE | Age: 74
End: 2024-07-05
Payer: COMMERCIAL

## 2024-07-05 VITALS
SYSTOLIC BLOOD PRESSURE: 146 MMHG | BODY MASS INDEX: 38.53 KG/M2 | RESPIRATION RATE: 18 BRPM | DIASTOLIC BLOOD PRESSURE: 84 MMHG | OXYGEN SATURATION: 98 % | TEMPERATURE: 98 F | WEIGHT: 246 LBS

## 2024-07-05 DIAGNOSIS — R44.1 VISUAL HALLUCINATIONS: Primary | ICD-10-CM

## 2024-07-05 PROCEDURE — 99213 OFFICE O/P EST LOW 20 MIN: CPT | Performed by: PHYSICIAN ASSISTANT

## 2024-07-05 NOTE — PROGRESS NOTES
Ambulatory Visit  Name: Jennie Hickey      : 1950      MRN: [id number]  Encounter Provider: Narciso Storm PA-C  Encounter Date: 2024   Encounter department: [facility]    Assessment & Plan   1. Visual hallucinations  -     MRI brain w wo contrast; Future; Expected date: 2024    - Labs and CT reviewed  - Will proceed with MRI brain to further evaluate symptoms  - Information provided for patient to contact psych for an appt from referral at last visit  - Follow up to review MRI     History of Present Illness   {Disappearing Hyperlinks I Encounters * My Last Note * Since Last Visit * History :29934}    Jennie is a 73-year-old female with history of hypertension, hyperlipidemia, prediabetes, and morbid obesity, presenting for follow up.    At last visit, patient reported visual hallucinations.  This was a new symptom to her which had developed over the past 2 months.  She had labs  which were unremarkable.  Head CT  revealed Chronic lacunar infarct in the right frontal lobe subcortical white matter and possibly right basal ganglia.  Today patient endorses ongoing hallucinations which are unchanged since her last visit.  She has not scheduled with psych as they could not get in touch with her.        Review of Systems   Constitutional:  Negative for chills, diaphoresis and fever.   Respiratory:  Negative for cough, chest tightness, shortness of breath and wheezing.    Cardiovascular:  Negative for chest pain, palpitations and leg swelling.   Gastrointestinal:  Negative for abdominal pain, blood in stool, constipation, diarrhea, nausea and vomiting.   Genitourinary:  Negative for difficulty urinating, dysuria, frequency, hematuria and urgency.   Skin:  Negative for rash and wound.   Neurological:  Negative for dizziness, syncope, weakness, light-headedness and headaches.   Psychiatric/Behavioral:  Positive for dysphoric mood and hallucinations. Negative  for decreased concentration, self-injury, sleep disturbance and suicidal ideas. The patient is not nervous/anxious.      Medical History Reviewed by provider this encounter:  Tobacco  Allergies  Meds  Problems  Med Hx  Surg Hx  Fam Hx       Current Outpatient Medications on File Prior to Visit   Medication Sig Dispense Refill   • amLODIPine (NORVASC) 5 mg tablet Take 1 tablet (5 mg total) by mouth daily 90 tablet 0   • atorvastatin (LIPITOR) 40 mg tablet Take 1 tablet (40 mg total) by mouth every evening 90 tablet 0   • metoprolol succinate (TOPROL-XL) 25 mg 24 hr tablet Take 1 tablet (25 mg total) by mouth daily 90 tablet 0     No current facility-administered medications on file prior to visit.      Social History     Tobacco Use   • Smoking status: Every Day     Current packs/day: 1.00     Types: Cigarettes   • Smokeless tobacco: Never   Vaping Use   • Vaping status: Never Used   Substance and Sexual Activity   • Alcohol use: Yes     Comment: rarely   • Drug use: No   • Sexual activity: Yes     Partners: Male     Objective   {Disappearing Hyperlinks   Review Vitals * Enter New Vitals * Results Review * Labs * Imaging * Cardiology * Procedures * Lung Cancer Screening :35665}  /84   Temp 98 °F (36.7 °C)   Resp 18   Wt 112 kg (246 lb)   SpO2 98%   BMI 38.53 kg/m²     Physical Exam  Vitals and nursing note reviewed.   Constitutional:       General: She is not in acute distress.     Appearance: Normal appearance. She is obese.   HENT:      Head: Normocephalic and atraumatic.   Eyes:      Extraocular Movements: Extraocular movements intact.      Conjunctiva/sclera: Conjunctivae normal.      Pupils: Pupils are equal, round, and reactive to light.   Cardiovascular:      Rate and Rhythm: Normal rate and regular rhythm.      Heart sounds: Normal heart sounds. No murmur heard.  Pulmonary:      Effort: Pulmonary effort is normal. No respiratory distress.      Breath sounds: Normal breath sounds. No  wheezing.   Skin:     General: Skin is warm and dry.   Neurological:      General: No focal deficit present.      Mental Status: She is alert and oriented to person, place, and time.      Cranial Nerves: No cranial nerve deficit.      Motor: No weakness.   Psychiatric:         Attention and Perception: She perceives visual hallucinations. She does not perceive auditory hallucinations.         Mood and Affect: Mood and affect normal.         Behavior: Behavior normal.         Thought Content: Thought content normal. Thought content is not delusional. Thought content does not include homicidal or suicidal ideation.       Administrative Statements {Disappearing Hyperlinks I  [facility]}

## 2024-07-16 ENCOUNTER — RA CDI HCC (OUTPATIENT)
Dept: OTHER | Facility: HOSPITAL | Age: 74
End: 2024-07-16

## 2024-08-30 ENCOUNTER — OFFICE VISIT (OUTPATIENT)
Dept: FAMILY MEDICINE CLINIC | Facility: HOME HEALTHCARE | Age: 74
End: 2024-08-30
Payer: COMMERCIAL

## 2024-08-30 VITALS
DIASTOLIC BLOOD PRESSURE: 80 MMHG | HEIGHT: 67 IN | TEMPERATURE: 98.6 F | HEART RATE: 74 BPM | BODY MASS INDEX: 38.61 KG/M2 | OXYGEN SATURATION: 96 % | WEIGHT: 246 LBS | RESPIRATION RATE: 18 BRPM | SYSTOLIC BLOOD PRESSURE: 120 MMHG

## 2024-08-30 DIAGNOSIS — E04.1 THYROID NODULE: Primary | ICD-10-CM

## 2024-08-30 PROCEDURE — 99213 OFFICE O/P EST LOW 20 MIN: CPT | Performed by: PHYSICIAN ASSISTANT

## 2024-08-30 NOTE — ASSESSMENT & PLAN NOTE
Multiple thyroid nodules noted on ultrasound in 2/2023.  Biopsy in 4/2023 was benign.  Will get repeat ultrasound

## 2024-08-30 NOTE — PROGRESS NOTES
Ambulatory Visit  Name: Jennie Hickey      : 1950      MRN: [id number]  Encounter Provider: Narciso Storm PA-C  Encounter Date: 2024   Encounter department: [facility]    Assessment & Plan   1. Thyroid nodule  Assessment & Plan:  Multiple thyroid nodules noted on ultrasound in 2023.  Biopsy in 2023 was benign.  Will get repeat ultrasound  Orders:  -     US thyroid; Future; Expected date: 2024       History of Present Illness     Jennie is a 73-year-old female with history of hypertension, hyperlipidemia, prediabetes, thyroid nodules, and morbid obesity, presenting with concern for a lump in her neck.    Patient reports noticing a lump in her left neck times approximately 1 week.  It is not painful.  She denies any upper respiratory symptoms or fever.  She has known thyroid nodules for which she did have a biopsy in 2023 which was benign.  TSH 2024 WNL.        Review of Systems   Constitutional:  Negative for chills, diaphoresis and fever.   HENT:  Negative for congestion, ear pain, rhinorrhea, sinus pressure, sore throat and trouble swallowing.         Neck mass   Respiratory:  Negative for cough, chest tightness, shortness of breath and wheezing.    Cardiovascular:  Negative for chest pain, palpitations and leg swelling.   Gastrointestinal:  Negative for abdominal pain, constipation, diarrhea, nausea and vomiting.   Skin:  Negative for rash and wound.   Neurological:  Negative for dizziness, syncope, weakness, light-headedness and headaches.     Medical History Reviewed by provider this encounter:  Tobacco  Allergies  Meds  Problems  Med Hx  Surg Hx  Fam Hx       Current Outpatient Medications on File Prior to Visit   Medication Sig Dispense Refill   • amLODIPine (NORVASC) 5 mg tablet Take 1 tablet (5 mg total) by mouth daily 90 tablet 0   • atorvastatin (LIPITOR) 40 mg tablet Take 1 tablet (40 mg total) by mouth every evening 90 tablet 0  "  • metoprolol succinate (TOPROL-XL) 25 mg 24 hr tablet Take 1 tablet (25 mg total) by mouth daily 90 tablet 0     No current facility-administered medications on file prior to visit.      Social History     Tobacco Use   • Smoking status: Every Day     Current packs/day: 1.00     Types: Cigarettes   • Smokeless tobacco: Never   Vaping Use   • Vaping status: Never Used   Substance and Sexual Activity   • Alcohol use: Yes     Comment: rarely   • Drug use: No   • Sexual activity: Yes     Partners: Male     Objective     /80 (BP Location: Left arm, Patient Position: Sitting, Cuff Size: Standard)   Pulse 74   Temp 98.6 °F (37 °C)   Resp 18   Ht 5' 7\" (1.702 m)   Wt 112 kg (246 lb)   SpO2 96%   BMI 38.53 kg/m²     Physical Exam  Vitals and nursing note reviewed.   Constitutional:       General: She is not in acute distress.     Appearance: Normal appearance. She is obese.   HENT:      Head: Normocephalic and atraumatic.   Eyes:      Extraocular Movements: Extraocular movements intact.      Conjunctiva/sclera: Conjunctivae normal.      Pupils: Pupils are equal, round, and reactive to light.   Neck:      Thyroid: Thyroid mass present.     Cardiovascular:      Rate and Rhythm: Normal rate and regular rhythm.      Heart sounds: Normal heart sounds. No murmur heard.  Pulmonary:      Effort: Pulmonary effort is normal. No respiratory distress.      Breath sounds: Normal breath sounds. No wheezing.   Musculoskeletal:      Cervical back: Normal range of motion.      Right lower leg: No edema.      Left lower leg: No edema.   Skin:     General: Skin is warm and dry.   Neurological:      General: No focal deficit present.      Mental Status: She is alert and oriented to person, place, and time.      Cranial Nerves: No cranial nerve deficit.      Motor: No weakness.   Psychiatric:         Mood and Affect: Mood normal.         Behavior: Behavior normal.       Administrative Statements           "

## 2024-10-04 ENCOUNTER — HOSPITAL ENCOUNTER (OUTPATIENT)
Dept: MRI IMAGING | Facility: HOSPITAL | Age: 74
End: 2024-10-04
Payer: COMMERCIAL

## 2024-10-04 ENCOUNTER — HOSPITAL ENCOUNTER (OUTPATIENT)
Dept: ULTRASOUND IMAGING | Facility: HOSPITAL | Age: 74
End: 2024-10-04
Payer: COMMERCIAL

## 2024-10-04 ENCOUNTER — VBI (OUTPATIENT)
Dept: ADMINISTRATIVE | Facility: OTHER | Age: 74
End: 2024-10-04

## 2024-10-04 DIAGNOSIS — I63.81 RIGHT-SIDED LACUNAR INFARCTION (HCC): Primary | ICD-10-CM

## 2024-10-04 DIAGNOSIS — E04.2 MULTIPLE THYROID NODULES: ICD-10-CM

## 2024-10-04 DIAGNOSIS — R44.1 VISUAL HALLUCINATIONS: ICD-10-CM

## 2024-10-04 DIAGNOSIS — E04.1 THYROID NODULE: ICD-10-CM

## 2024-10-04 PROCEDURE — 70553 MRI BRAIN STEM W/O & W/DYE: CPT

## 2024-10-04 PROCEDURE — A9585 GADOBUTROL INJECTION: HCPCS | Performed by: PHYSICIAN ASSISTANT

## 2024-10-04 PROCEDURE — 76536 US EXAM OF HEAD AND NECK: CPT

## 2024-10-04 RX ORDER — GADOBUTROL 604.72 MG/ML
11 INJECTION INTRAVENOUS
Status: COMPLETED | OUTPATIENT
Start: 2024-10-04 | End: 2024-10-04

## 2024-10-04 RX ADMIN — GADOBUTROL 11 ML: 604.72 INJECTION INTRAVENOUS at 08:11

## 2024-10-04 NOTE — TELEPHONE ENCOUNTER
10/04/24 10:18 AM     Chart reviewed for CRC: Colonoscopy ; nothing is submitted to the patient's insurance at this time.     Mandi Hines MA   [facility]

## 2024-10-07 DIAGNOSIS — E78.5 DYSLIPIDEMIA: ICD-10-CM

## 2024-10-07 DIAGNOSIS — I10 PRIMARY HYPERTENSION: ICD-10-CM

## 2024-10-07 RX ORDER — METOPROLOL SUCCINATE 25 MG/1
25 TABLET, EXTENDED RELEASE ORAL DAILY
Qty: 90 TABLET | Refills: 0 | Status: SHIPPED | OUTPATIENT
Start: 2024-10-07

## 2024-10-07 RX ORDER — AMLODIPINE BESYLATE 5 MG/1
5 TABLET ORAL DAILY
Qty: 90 TABLET | Refills: 0 | Status: SHIPPED | OUTPATIENT
Start: 2024-10-07

## 2024-10-07 RX ORDER — ATORVASTATIN CALCIUM 40 MG/1
40 TABLET, FILM COATED ORAL EVERY EVENING
Qty: 90 TABLET | Refills: 0 | Status: SHIPPED | OUTPATIENT
Start: 2024-10-07

## 2024-10-30 ENCOUNTER — VBI (OUTPATIENT)
Dept: ADMINISTRATIVE | Facility: OTHER | Age: 74
End: 2024-10-30

## 2024-10-30 NOTE — TELEPHONE ENCOUNTER
10/30/24 11:26 AM     Chart reviewed for Mammogram was/were not submitted to the patient's insurance.     Marine Shah MA   [facility]   Yes

## 2024-12-23 ENCOUNTER — OFFICE VISIT (OUTPATIENT)
Dept: NEUROLOGY | Facility: CLINIC | Age: 74
End: 2024-12-23
Payer: COMMERCIAL

## 2024-12-23 VITALS
BODY MASS INDEX: 37.53 KG/M2 | WEIGHT: 239.1 LBS | SYSTOLIC BLOOD PRESSURE: 122 MMHG | HEIGHT: 67 IN | DIASTOLIC BLOOD PRESSURE: 80 MMHG

## 2024-12-23 DIAGNOSIS — E78.5 DYSLIPIDEMIA: ICD-10-CM

## 2024-12-23 DIAGNOSIS — I63.81 RIGHT-SIDED LACUNAR INFARCTION (HCC): ICD-10-CM

## 2024-12-23 DIAGNOSIS — Z86.73 HISTORY OF STROKE: Primary | ICD-10-CM

## 2024-12-23 DIAGNOSIS — R44.3 HALLUCINATION: ICD-10-CM

## 2024-12-23 DIAGNOSIS — R73.03 PREDIABETES: ICD-10-CM

## 2024-12-23 DIAGNOSIS — I10 PRIMARY HYPERTENSION: ICD-10-CM

## 2024-12-23 DIAGNOSIS — E04.2 MULTIPLE THYROID NODULES: ICD-10-CM

## 2024-12-23 DIAGNOSIS — Z72.0 TOBACCO USE: ICD-10-CM

## 2024-12-23 PROCEDURE — 99214 OFFICE O/P EST MOD 30 MIN: CPT | Performed by: PSYCHIATRY & NEUROLOGY

## 2024-12-23 PROCEDURE — G2211 COMPLEX E/M VISIT ADD ON: HCPCS | Performed by: PSYCHIATRY & NEUROLOGY

## 2024-12-23 RX ORDER — ROSUVASTATIN CALCIUM 40 MG/1
TABLET, COATED ORAL
Qty: 90 TABLET | Refills: 1 | Status: SHIPPED | OUTPATIENT
Start: 2024-12-23

## 2024-12-23 NOTE — PROGRESS NOTES
Name: Jennie Hickey      : 1950      MRN: [id number]  Encounter Provider: Gilbert Small MD  Encounter Date: 2024   Encounter department: [facility] [place]  :  Assessment & Plan  History of stroke  Pt w hx of stroke (chronic R ischemic stroke) presenting for evaluation/management. Per incidental finding from MRI Oct 2024 pt does have chronic R ischemic stroke, which is a new development when compared to MRI from .  Most likely small vessel ischemic disease.     Initiate ASA 81 mg PO qd.  Initiate Rosuvastatin 40 mg PO q HS.  Pt to take 1/2 tablet nightly for two weeks, followed by increase to 1 tablet pending tolerability.  Repeat CMP and lipid panel in 3 mos.  Continue metoprolol 25 mg PO qd  Maintain tight glycemic control with goal HbA1c<7  Carotid Doppler U/S given pt's hx / risk fx for atherosclerotic disease  Counseled pt on risk factors including smoking cessation -- pt not interested in smoking cessation at this time        Hallucination  Labs obtained by PCP for evaluation of psychosis largely unremarkable, including RPR, TSH, hepatitis panel, folate, B12. Neuroimaging unremarkable for acute processes that would result in hallucinations.  Given pt's visual impairment secondary to AMD, nature of hallucinations, and timeline of events, consider Trace Bonnet syndrome. Given pt's age this is unlikely to be a primary psychotic disorder however will maintain broad differential.     Offered pt reassurance and education regarding her presentation and likely diagnosis. Can consider pharmacologics if symptoms worsen or become functionally impairing. Pt agreeable to monitoring for now and is receptive to reassurance.        Right-sided lacunar infarction (HCC)    Orders:    Ambulatory Referral to Neurology    Primary hypertension  Management per Primary. Goal BP <130/80.   Continue metoprolol 25 mg PO qd.        Tobacco use         Dyslipidemia    Orders:     rosuvastatin (CRESTOR) 40 MG tablet; 1/2 tab QPM X 2 weeks, then 1 tab QPM    Comprehensive metabolic panel; Future    Lipid Panel with Direct LDL reflex; Future    Prediabetes           Patient Instructions   Stroke: Mirlande presents for an initial consultation with regard to her stroke.  In particular, in October of this year she had an MRI of the brain performed.  This showed a chronic appearing right-sided ischemic stroke.  It was not present on the prior MRI from 2022 which I confirmed directly.  This stroke is most likely the result of small vessel ischemic disease  -At this time she should begin aspirin 81 mg daily  -We will transition her to rosuvastatin 40 mg tablets.  She should take  1/2 tablet at night for 2 weeks and if there are no side effects increase to 1 full tablet at night.  We will recheck a cholesterol panel and competence of metabolic panel in 3 months with a target LDL of less than 70  -We recommend an ongoing hemoglobin A1c of less than 7%.  She should also check her blood pressure ideally once per day targeting numbers less than 130/80  -She is not interested in quitting smoking at this time but we would be happy to assist her in this regard in the future  -Considering her interval stroke and risk factors for atherosclerotic disease we will request a carotid Doppler ultrasound    I will plan for her to return to the office in 4 months time to see me directly but would be happy to see her sooner if the need should arise.  If she has any symptoms concerning for TIA or stroke including sudden painless loss of vision or double vision, difficulty speaking or swallowing, vertigo/room spinning that does not quickly resolve, or weakness/numbness/loss of coordination affecting 1 side of the face or body she should proceed by ambulance to the nearest emergency room immediately.       History of Present Illness   HPI     Jennie Hickey is a 73 YO female presenting for evaluation of previous stroke. In  October 2024 pt underwent neuroimaging due to new onset hallucinations. Incidentally, MRI revealed chronic right-sided ischemic stroke, which was not visible on previous MRI from 2022. Pt is seen alongside her  today. Currently pt denies any weakness, numbness/tingling, syncopal episodes, headaches, N/V, dizziness.    Pt states her main concern is hallucinations. Of note, pt's  states he finds her talking to herself frequently throughout the day. Pt endorses visual hallucinations of unfamiliar people, dogs, and babies, which she experiences multiple times throughout the day regardless of environment, beginning in April. She states she cannot hear these individuals talk but does see their mouths moving. Denies substance use except for cigarettes. She denies any auditory hallucinations. She denies ever experiencing any AVH or delusions in the past. Regarding REM sleep concerns, pt's  states she does sleep walk and sleep talk occasionally, noting approximately four episodes over the past year. Of note, she states her vision worsened in February secondary to macular degeneration. She denies any other associated medical changes. Denies hx of head trauma, family hx of psychiatric conditions, and previous hx of any affective or psychotic disorders. Pt does note that her mother has a hx of Parkinson's. Pt herself denies any tremors, rigidity, gait abnormalities, as well as any difficulties with memory. She finds the VH distressing as she states sometimes the people hold weapons and point at her. She denies any associated SI/HI. Does endorse anxiety related to VH but otherwise denies any mood disturbances. She is able to calm herself by acknowledging that it is a hallucination since her  cannot see these figures. She is requesting medication to alleviate the VH.       Review of Systems I have personally reviewed the MA's review of systems and made changes as necessary.    Current Outpatient  "Medications on File Prior to Visit   Medication Sig Dispense Refill    amLODIPine (NORVASC) 5 mg tablet Take 1 tablet (5 mg total) by mouth daily 90 tablet 0    metoprolol succinate (TOPROL-XL) 25 mg 24 hr tablet Take 1 tablet (25 mg total) by mouth daily 90 tablet 0    [DISCONTINUED] atorvastatin (LIPITOR) 40 mg tablet Take 1 tablet (40 mg total) by mouth every evening 90 tablet 0     No current facility-administered medications on file prior to visit.         Objective   /80 (BP Location: Right arm, Patient Position: Sitting, Cuff Size: Large)   Ht 5' 7\" (1.702 m)   Wt 108 kg (239 lb 1.6 oz)   BMI 37.45 kg/m²     Physical Exam  Vitals reviewed.   Constitutional:       Appearance: She is overweight.   HENT:      Right Ear: Hearing normal.      Left Ear: Hearing normal.   Eyes:      General: Lids are normal.      Extraocular Movements: Extraocular movements intact.   Neurological:      Coordination: Coordination is intact.   Psychiatric:         Attention and Perception: She perceives visual hallucinations. She does not perceive auditory hallucinations.         Speech: Speech normal.         Behavior: Behavior is cooperative.         Thought Content: Thought content is not paranoid or delusional. Thought content does not include homicidal or suicidal ideation.         Cognition and Memory: Cognition is not impaired. Memory is not impaired.       Neurological Exam  Mental Status  Awake, alert and oriented to person, place and time. Oriented to person, place, time and situation. Recalls 3 of 3 objects immediately. Speech is normal. Able to name objects, repeat and read. Follows complex commands. Attention and concentration are normal.    Cranial Nerves  CN II: Right visual acuity: Finger movement. Left visual acuity: Finger movement. Visual acuity diminished 2/2 AMD.  CN III, IV, VI: Extraocular movements intact bilaterally. Normal lids and orbits bilaterally.  CN VIII:  Right: Hearing is normal.  Left: " Hearing is normal.  CN IX, X: Palate elevates symmetrically  CN XI:  Right: Trapezius strength is normal.  Left: Trapezius strength is normal.    Motor  Normal muscle bulk throughout. Normal muscle tone. No abnormal involuntary movements. No pronator drift.    Sensory  Sensation is intact to light touch, pinprick, vibration and proprioception in all four extremities.    Coordination    Finger-to-nose, rapid alternating movements and heel-to-shin normal bilaterally without dysmetria.    Gait  Normal casual, toe, heel and tandem gait.      Radiology Results Review: I have reviewed the following radiology reports:  - CT HEAD WO CONTRAST 06/28/24  - MRI BRAIN W WO CONTRAST 10/04/24    Administrative Statements   I have spent a total time of 60 minutes in caring for this patient on the day of the visit/encounter including Diagnostic results, Risks and benefits of tx options, Instructions for management, Patient and family education, Importance of tx compliance, Risk factor reductions, Impressions, Counseling / Coordination of care, Documenting in the medical record, Reviewing / ordering tests, medicine, procedures  , Obtaining or reviewing history  , and Communicating with other healthcare professionals .

## 2024-12-23 NOTE — ASSESSMENT & PLAN NOTE
Labs obtained by PCP for evaluation of psychosis largely unremarkable, including RPR, TSH, hepatitis panel, folate, B12. Neuroimaging unremarkable for acute processes that would result in hallucinations.  Given pt's visual impairment secondary to AMD, nature of hallucinations, and timeline of events, consider Trace Bonnet syndrome. Given pt's age this is unlikely to be a primary psychotic disorder however will maintain broad differential.     Offered pt reassurance and education regarding her presentation and likely diagnosis. Can consider pharmacologics if symptoms worsen or become functionally impairing. Pt agreeable to monitoring for now and is receptive to reassurance.

## 2024-12-23 NOTE — ASSESSMENT & PLAN NOTE
Orders:    rosuvastatin (CRESTOR) 40 MG tablet; 1/2 tab QPM X 2 weeks, then 1 tab QPM    Comprehensive metabolic panel; Future    Lipid Panel with Direct LDL reflex; Future

## 2024-12-23 NOTE — PATIENT INSTRUCTIONS
Stroke: Mirlande presents for an initial consultation with regard to her stroke.  In particular, in October of this year she had an MRI of the brain performed.  This showed a chronic appearing right-sided ischemic stroke.  It was not present on the prior MRI from 2022 which I confirmed directly.  This stroke is most likely the result of small vessel ischemic disease  -At this time she should begin aspirin 81 mg daily  -We will transition her to rosuvastatin 40 mg tablets.  She should take  1/2 tablet at night for 2 weeks and if there are no side effects increase to 1 full tablet at night.  We will recheck a cholesterol panel and competence of metabolic panel in 3 months with a target LDL of less than 70  -We recommend an ongoing hemoglobin A1c of less than 7%.  She should also check her blood pressure ideally once per day targeting numbers less than 130/80  -She is not interested in quitting smoking at this time but we would be happy to assist her in this regard in the future  -Considering her interval stroke and risk factors for atherosclerotic disease we will request a carotid Doppler ultrasound    I will plan for her to return to the office in 4 months time to see me directly but would be happy to see her sooner if the need should arise.  If she has any symptoms concerning for TIA or stroke including sudden painless loss of vision or double vision, difficulty speaking or swallowing, vertigo/room spinning that does not quickly resolve, or weakness/numbness/loss of coordination affecting 1 side of the face or body she should proceed by ambulance to the nearest emergency room immediately.

## 2024-12-23 NOTE — ASSESSMENT & PLAN NOTE
Pt w hx of stroke (chronic R ischemic stroke) presenting for evaluation/management. Per incidental finding from MRI Oct 2024 pt does have chronic R ischemic stroke, which is a new development when compared to MRI from 2022.  Most likely small vessel ischemic disease.     Initiate ASA 81 mg PO qd.  Initiate Rosuvastatin 40 mg PO q HS.  Pt to take 1/2 tablet nightly for two weeks, followed by increase to 1 tablet pending tolerability.  Repeat CMP and lipid panel in 3 mos.  Continue metoprolol 25 mg PO qd  Maintain tight glycemic control with goal HbA1c<7  Carotid Doppler U/S given pt's hx / risk fx for atherosclerotic disease  Counseled pt on risk factors including smoking cessation -- pt not interested in smoking cessation at this time

## 2024-12-23 NOTE — PROGRESS NOTES
Name: Jennie Hickey      : 1950      MRN: [id number]  Encounter Provider: Gilbert Small MD  Encounter Date: 2024   Encounter department: [facility] [place]  :  Assessment & Plan  History of stroke         Hallucination         Right-sided lacunar infarction (HCC)    Orders:  •  Ambulatory Referral to Neurology    Primary hypertension         Tobacco use         Dyslipidemia    Orders:  •  rosuvastatin (CRESTOR) 40 MG tablet; 1/2 tab QPM X 2 weeks, then 1 tab QPM  •  Comprehensive metabolic panel; Future  •  Lipid Panel with Direct LDL reflex; Future    Prediabetes         Multiple thyroid nodules    Orders:  •  Ambulatory Referral to Neurology      {Ambulatory Patient Instructions (Optional):99548}    History of Present Illness {?Quick Links Encounters * My Last Note * Last Note in Specialty * Snapshot * Since Last Visit * History :29037}  HPI  Review of Systems   Constitutional:  Negative for appetite change, fatigue and fever.   HENT: Negative.  Negative for hearing loss, tinnitus, trouble swallowing and voice change.    Eyes: Negative.  Negative for photophobia, pain and visual disturbance.   Respiratory: Negative.  Negative for shortness of breath.    Cardiovascular: Negative.  Negative for palpitations.   Gastrointestinal: Negative.  Negative for nausea and vomiting.   Endocrine: Negative.  Negative for cold intolerance.   Genitourinary: Negative.  Negative for dysuria, frequency and urgency.   Musculoskeletal:  Negative for back pain, gait problem, myalgias, neck pain and neck stiffness.   Skin: Negative.  Negative for rash.   Allergic/Immunologic: Negative.    Neurological: Negative.  Negative for dizziness, tremors, seizures, syncope, facial asymmetry, speech difficulty, weakness, light-headedness, numbness and headaches.   Hematological: Negative.  Does not bruise/bleed easily.   Psychiatric/Behavioral:  Positive for hallucinations. Negative for confusion and  "sleep disturbance.     I have personally reviewed the MA's review of systems and made changes as necessary.    {Select to insert medical history sections (Optional):11747}     Objective {?Quick Links Trend Vitals * Enter New Vitals * Results Review * Timeline (Adult) * Labs * Imaging * Cardiology * Procedures * Lung Cancer Screening * Surgical eConsent :17197}  /80 (BP Location: Right arm, Patient Position: Sitting, Cuff Size: Large)   Ht 5' 7\" (1.702 m)   Wt 108 kg (239 lb 1.6 oz)   BMI 37.45 kg/m²     Physical Exam  Neurological Exam    {Radiology Results Review (Optional):29440}    {Administrative / Billing Section (Optional):30014}  "

## 2024-12-30 NOTE — ASSESSMENT & PLAN NOTE
Orders:  •  rosuvastatin (CRESTOR) 40 MG tablet; 1/2 tab QPM X 2 weeks, then 1 tab QPM  •  Comprehensive metabolic panel; Future  •  Lipid Panel with Direct LDL reflex; Future

## 2024-12-30 NOTE — PROGRESS NOTES
Name: Jennie Hickey      : 1950      MRN: [id number]  Encounter Provider: Gilbert Small MD  Encounter Date: 2024   Encounter department: [facility] [place]  :  Assessment & Plan  History of stroke         Hallucination         Right-sided lacunar infarction (HCC)    Orders:  •  Ambulatory Referral to Neurology    Primary hypertension         Tobacco use         Dyslipidemia    Orders:  •  rosuvastatin (CRESTOR) 40 MG tablet; 1/2 tab QPM X 2 weeks, then 1 tab QPM  •  Comprehensive metabolic panel; Future  •  Lipid Panel with Direct LDL reflex; Future    Prediabetes         Multiple thyroid nodules    Orders:  •  Ambulatory Referral to Neurology      Patient Instructions   Stroke: Mirlande presents for an initial consultation with regard to her stroke.  In particular, in October of this year she had an MRI of the brain performed.  This showed a chronic appearing right-sided ischemic stroke.  It was not present on the prior MRI from  which I confirmed directly.  This stroke is most likely the result of small vessel ischemic disease  -At this time she should begin aspirin 81 mg daily  -We will transition her to rosuvastatin 40 mg tablets.  She should take  1/2 tablet at night for 2 weeks and if there are no side effects increase to 1 full tablet at night.  We will recheck a cholesterol panel and competence of metabolic panel in 3 months with a target LDL of less than 70  -We recommend an ongoing hemoglobin A1c of less than 7%.  She should also check her blood pressure ideally once per day targeting numbers less than 130/80  -She is not interested in quitting smoking at this time but we would be happy to assist her in this regard in the future  -Considering her interval stroke and risk factors for atherosclerotic disease we will request a carotid Doppler ultrasound    I will plan for her to return to the office in 4 months time to see me directly but would be happy to see her  sooner if the need should arise.  If she has any symptoms concerning for TIA or stroke including sudden painless loss of vision or double vision, difficulty speaking or swallowing, vertigo/room spinning that does not quickly resolve, or weakness/numbness/loss of coordination affecting 1 side of the face or body she should proceed by ambulance to the nearest emergency room immediately.       History of Present Illness   HPI     Jennie Hickey is a 73 YO female presenting for evaluation of previous stroke. In October 2024 pt underwent neuroimaging due to new onset hallucinations. Incidentally, MRI revealed chronic right-sided ischemic stroke, which was not visible on previous MRI from 2022. Pt is seen alongside her  today. Currently pt denies any weakness, numbness/tingling, syncopal episodes, headaches, N/V, dizziness.    Pt states her main concern is hallucinations. Of note, pt's  states he finds her talking to herself frequently throughout the day. Pt endorses visual hallucinations of unfamiliar people, dogs, and babies, which she experiences multiple times throughout the day regardless of environment, beginning in April. She states she cannot hear these individuals talk but does see their mouths moving. Denies substance use except for cigarettes. She denies any auditory hallucinations. She denies ever experiencing any AVH or delusions in the past. Regarding REM sleep concerns, pt's  states she does sleep walk and sleep talk occasionally, noting approximately four episodes over the past year. Of note, she states her vision worsened in February secondary to macular degeneration. She denies any other associated medical changes. Denies hx of head trauma, family hx of psychiatric conditions, and previous hx of any affective or psychotic disorders. Pt does note that her mother has a hx of Parkinson's. Pt herself denies any tremors, rigidity, gait abnormalities, as well as any difficulties with  "memory. She finds the VH distressing as she states sometimes the people hold weapons and point at her. She denies any associated SI/HI. Does endorse anxiety related to VH but otherwise denies any mood disturbances. She is able to calm herself by acknowledging that it is a hallucination since her  cannot see these figures. She is requesting medication to alleviate the VH.       Review of Systems I have personally reviewed the MA's review of systems and made changes as necessary.    Current Outpatient Medications on File Prior to Visit   Medication Sig Dispense Refill   • amLODIPine (NORVASC) 5 mg tablet Take 1 tablet (5 mg total) by mouth daily 90 tablet 0   • metoprolol succinate (TOPROL-XL) 25 mg 24 hr tablet Take 1 tablet (25 mg total) by mouth daily 90 tablet 0     No current facility-administered medications on file prior to visit.         Objective   /80 (BP Location: Right arm, Patient Position: Sitting, Cuff Size: Large)   Ht 5' 7\" (1.702 m)   Wt 108 kg (239 lb 1.6 oz)   BMI 37.45 kg/m²     Physical Exam  Vitals reviewed.   Constitutional:       Appearance: She is overweight.   HENT:      Right Ear: Hearing normal.      Left Ear: Hearing normal.   Eyes:      General: Lids are normal.      Extraocular Movements: Extraocular movements intact.   Neurological:      Coordination: Coordination is intact.   Psychiatric:         Attention and Perception: She perceives visual hallucinations. She does not perceive auditory hallucinations.         Speech: Speech normal.         Behavior: Behavior is cooperative.         Thought Content: Thought content is not paranoid or delusional. Thought content does not include homicidal or suicidal ideation.         Cognition and Memory: Cognition is not impaired. Memory is not impaired.         I was present for, directly observed, and agree with the exam as documented by the resident.  Documentation below reflects my recommended edits.    Neurological Exam  Mental " Status  Awake, alert and oriented with no dysarthria or aphasia     Cranial Nerves  CN II: Right visual acuity: Finger movement. Left visual acuity: Finger movement. Visual acuity diminished 2/2 AMD.  CN III, IV, VI: Extraocular movements intact bilaterally. Normal lids and orbits bilaterally.  CN VIII:  Right: Hearing is normal.  Left: Hearing is normal.  CN IX, X: Palate elevates symmetrically  CN XI:  Right: Trapezius strength is normal.  Left: Trapezius strength is normal.     Motor  Normal muscle bulk throughout. No abnormal involuntary movements.       Coordination  No clear tremor or ataxia      Gait  Normal casual gait.    Radiology Results Review: I have reviewed the following radiology reports:  - CT HEAD WO CONTRAST 06/28/24  - MRI BRAIN W WO CONTRAST 10/04/24    Administrative Statements

## 2025-01-09 ENCOUNTER — TELEPHONE (OUTPATIENT)
Age: 75
End: 2025-01-09

## 2025-01-09 NOTE — TELEPHONE ENCOUNTER
Inbound call received from Jimenez, patient's spouse. Jennie gave verbal consent to speak with Jimenez.     Jimenez stated pt saw Dr. Small back on 12/23/24 and Dr. Small was going to call the pt and Jimenez to discuss pt's diagnosis.    RN reviewed Patient instructions from pt's appt. Pt has been taking the ASA 81 mg daily and the Rosuvastatin 40 mg BID. Considering pt's interval stroke and risk factors for atherosclerotic disease Dr. Small will request a carotid Doppler ultrasound. Please advise and place order.     Did not see any documentation in office note note or other encounters stating you were going to reach out to pt to discuss current diagnosis. Pt and spouse requesting if Dr. Small can call  sometime today or tomorrow to discuss further.       Dr. Small: please advise.

## 2025-01-15 ENCOUNTER — OFFICE VISIT (OUTPATIENT)
Dept: FAMILY MEDICINE CLINIC | Facility: HOME HEALTHCARE | Age: 75
End: 2025-01-15
Payer: COMMERCIAL

## 2025-01-15 VITALS
BODY MASS INDEX: 37.61 KG/M2 | WEIGHT: 239.6 LBS | HEIGHT: 67 IN | DIASTOLIC BLOOD PRESSURE: 82 MMHG | HEART RATE: 65 BPM | RESPIRATION RATE: 18 BRPM | SYSTOLIC BLOOD PRESSURE: 136 MMHG | TEMPERATURE: 97.9 F | OXYGEN SATURATION: 93 %

## 2025-01-15 DIAGNOSIS — H90.3 SENSORINEURAL HEARING LOSS (SNHL) OF BOTH EARS: ICD-10-CM

## 2025-01-15 DIAGNOSIS — R22.1 NECK MASS: Primary | ICD-10-CM

## 2025-01-15 PROCEDURE — 99213 OFFICE O/P EST LOW 20 MIN: CPT | Performed by: PHYSICIAN ASSISTANT

## 2025-01-15 NOTE — PROGRESS NOTES
"Name: Jennie Hickey      : 1950      MRN: [id number]  Encounter Provider: Narciso Storm PA-C  Encounter Date: 1/15/2025   [facility]  :  Assessment & Plan  Neck mass  Palpable, mobile nodule of the posterior neck.  Suspect lipoma/cyst.  Will refer to general surgery for follow up.  Orders:  •  Ambulatory Referral to General Surgery; Future    Sensorineural hearing loss (SNHL) of both ears    Orders:  •  Ambulatory Referral to Audiology; Future          Depression Screening and Follow-up Plan: Patient was screened for depression during today's encounter. They screened negative with a PHQ-2 score of 0.    Tobacco Cessation Counseling: Tobacco cessation counseling was provided. The patient is sincerely urged to quit consumption of tobacco. She is not ready to quit tobacco. Medication options not discussed.       History of Present Illness     Jennie is a 74-year-old female with history of hypertension, hyperlipidemia, prediabetes, thyroid nodules, and morbid obesity, presenting with concern for a lump in her neck.    Patient reports a lump in the back of her neck for several years which has been getting bigger and is occasionally painful.      Review of Systems   Constitutional:  Negative for chills, diaphoresis and fever.   Respiratory:  Negative for cough, chest tightness, shortness of breath and wheezing.    Cardiovascular:  Negative for chest pain, palpitations and leg swelling.   Gastrointestinal:  Negative for abdominal pain, constipation, diarrhea, nausea and vomiting.   Skin:  Negative for rash and wound.   Neurological:  Negative for dizziness, syncope, weakness, light-headedness and headaches.       Objective   /82 (BP Location: Left arm, Patient Position: Sitting, Cuff Size: Large)   Pulse 65   Temp 97.9 °F (36.6 °C) (Tympanic)   Resp 18   Ht 5' 7\" (1.702 m)   Wt 109 kg (239 lb 9.6 oz)   SpO2 93%   BMI 37.53 kg/m²    "   Physical Exam  Vitals and nursing note reviewed.   Constitutional:       General: She is not in acute distress.     Appearance: Normal appearance. She is obese.   HENT:      Head: Normocephalic and atraumatic.   Eyes:      Extraocular Movements: Extraocular movements intact.      Conjunctiva/sclera: Conjunctivae normal.      Pupils: Pupils are equal, round, and reactive to light.   Neck:     Cardiovascular:      Rate and Rhythm: Normal rate and regular rhythm.      Heart sounds: Normal heart sounds. No murmur heard.  Pulmonary:      Effort: Pulmonary effort is normal. No respiratory distress.      Breath sounds: Normal breath sounds. No wheezing.   Neurological:      General: No focal deficit present.      Mental Status: She is alert and oriented to person, place, and time.      Cranial Nerves: No cranial nerve deficit.      Motor: No weakness.   Psychiatric:         Mood and Affect: Mood normal.         Behavior: Behavior normal.

## 2025-01-29 DIAGNOSIS — R44.3 HALLUCINATION: Primary | ICD-10-CM

## 2025-01-29 RX ORDER — QUETIAPINE FUMARATE 25 MG/1
TABLET, FILM COATED ORAL
Qty: 60 TABLET | Refills: 0 | Status: SHIPPED | OUTPATIENT
Start: 2025-01-29

## 2025-01-29 NOTE — PROGRESS NOTES
I called and spoke to the patient and her .  They confirm that she continues to experience visual hallucinations.  The hallucinations remain visual only with no auditory component.  At this point they have become significantly bothersome to her.  They report that she saw an eye doctor a few months ago and was told there was nothing that could be done in terms of her vision itself.  The hallucinations occur throughout the day and also throughout the evening (she describes herself somewhat as a night owl), but she does not have any difficulty falling asleep.    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  There is no single approach to treatment however atypical antipsychotics are frequently used.  Anticonvulsants sometimes can be used (clonazepam, carbamazepine, valproate, gabapentin), and other medications (escitalopram, donepezil, venlafaxine).  She reportedly had a little bit of alcohol over the course of the Christmas holidays and while this did not get rid of the hallucinations entirely it did improve him to a degree.    After discussion we will begin with Seroquel 25 mg taken in the evening (but not right before bed per se).  She was instructed to do this for 7 to 10 days and told that the medicine might make her a little tired or dizzy.  She was told to call the office in 7 to 10 days and depending on the result we would increase the dose.  If she notes good benefit from the medication but that the benefit is limited to the evening we would likely increase to 25 mg twice per day.  If she notices only minimal benefit we would likely increase to 50 mg in the evening.  If she notes side effects she was told to stop the medicine immediately.

## 2025-02-03 ENCOUNTER — TELEPHONE (OUTPATIENT)
Dept: FAMILY MEDICINE CLINIC | Facility: HOME HEALTHCARE | Age: 75
End: 2025-02-03

## 2025-02-12 DIAGNOSIS — I10 PRIMARY HYPERTENSION: ICD-10-CM

## 2025-02-12 RX ORDER — METOPROLOL SUCCINATE 25 MG/1
25 TABLET, EXTENDED RELEASE ORAL DAILY
Qty: 90 TABLET | Refills: 0 | Status: SHIPPED | OUTPATIENT
Start: 2025-02-12

## 2025-02-12 RX ORDER — AMLODIPINE BESYLATE 5 MG/1
5 TABLET ORAL DAILY
Qty: 90 TABLET | Refills: 1 | OUTPATIENT
Start: 2025-02-12

## 2025-02-12 RX ORDER — AMLODIPINE BESYLATE 5 MG/1
5 TABLET ORAL DAILY
Qty: 90 TABLET | Refills: 0 | Status: SHIPPED | OUTPATIENT
Start: 2025-02-12

## 2025-02-12 RX ORDER — METOPROLOL SUCCINATE 25 MG/1
25 TABLET, EXTENDED RELEASE ORAL DAILY
Qty: 90 TABLET | Refills: 1 | OUTPATIENT
Start: 2025-02-12

## 2025-02-12 NOTE — TELEPHONE ENCOUNTER
Reason for call:   [x] Refill   [] Prior Auth  [] Other:     Office:   [x] [facility]Provider [facility]   [] Specialty/Provider -     metoprolol succinate (TOPROL-XL) 25 mg 24 hr tablet /  Take 1 tablet (25 mg total) by mouth daily, / Qty 90    amLODIPine (NORVASC) 5 mg tablet/ Take 1 tablet (5 mg total) by mouth daily, / Qty 90    Pharmacy:  [address], [place]  [address]     Does the patient have enough for 3 days?   [] Yes   [x] No - Send as HP to POD

## 2025-02-20 ENCOUNTER — TELEPHONE (OUTPATIENT)
Age: 75
End: 2025-02-20

## 2025-02-20 NOTE — TELEPHONE ENCOUNTER
Pt spouse called in to speak with  regarding the medication.    Spouse stated that it helped in the morning and towards the afternoon the hallucinations started again.     Pt spouse stated that they are okay if the dosage has to be increased.     Call back#: [phone]

## 2025-02-25 NOTE — TELEPHONE ENCOUNTER
Patients  called back since he has not heard anything about wifes medication.     He is inquring if he should increase the dosage of the Seroquel due to hallucenations starting.    please advise
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
## 2025-02-27 NOTE — TELEPHONE ENCOUNTER
Gilbert Small MD  You; Neurology Neurovascular Team 415 hours ago (8:37 PM)       Yes she can increase to 2 tabs or 50mg at night at this point.  Definitely need to watch for any changes in behavior or suicidal thoughts or tendencies (if any stop the med immediately and let me know).  This is still a low dose so my hope is that she will do just fine on it.  They should let me know if a few weeks how she is doing on the medication and we can increase or adjust from there.

## 2025-03-26 ENCOUNTER — VBI (OUTPATIENT)
Dept: ADMINISTRATIVE | Facility: OTHER | Age: 75
End: 2025-03-26

## 2025-03-26 NOTE — TELEPHONE ENCOUNTER
03/26/25 3:54 PM     Chart reviewed for CRC: Colonoscopy ; nothing is submitted to the patient's insurance at this time.     Mandi Hines MA   [facility]

## 2025-04-10 ENCOUNTER — VBI (OUTPATIENT)
Dept: ADMINISTRATIVE | Facility: OTHER | Age: 75
End: 2025-04-10

## 2025-04-10 NOTE — TELEPHONE ENCOUNTER
04/10/25 9:24 AM     Chart reviewed for Mammogram was/were not submitted to the patient's insurance.     Marine Shah MA   [facility]

## 2025-07-11 ENCOUNTER — TELEPHONE (OUTPATIENT)
Dept: NEUROLOGY | Facility: CLINIC | Age: 75
End: 2025-07-11

## 2025-07-11 NOTE — TELEPHONE ENCOUNTER
Called and LVM for pt in regards to confirming upcoming appt w/ Dr. Small. Provided pt w/ appt time, date, office address, and phone number.